# Patient Record
Sex: MALE | Race: BLACK OR AFRICAN AMERICAN | Employment: UNEMPLOYED | URBAN - METROPOLITAN AREA
[De-identification: names, ages, dates, MRNs, and addresses within clinical notes are randomized per-mention and may not be internally consistent; named-entity substitution may affect disease eponyms.]

---

## 2017-05-02 ENCOUNTER — ED HISTORICAL/CONVERTED ENCOUNTER (OUTPATIENT)
Dept: OTHER | Age: 62
End: 2017-05-02

## 2017-08-14 ENCOUNTER — ED HISTORICAL/CONVERTED ENCOUNTER (OUTPATIENT)
Dept: OTHER | Age: 62
End: 2017-08-14

## 2018-03-27 ENCOUNTER — IP HISTORICAL/CONVERTED ENCOUNTER (OUTPATIENT)
Dept: OTHER | Age: 63
End: 2018-03-27

## 2018-07-09 ENCOUNTER — IP HISTORICAL/CONVERTED ENCOUNTER (OUTPATIENT)
Dept: OTHER | Age: 63
End: 2018-07-09

## 2018-09-16 ENCOUNTER — OP HISTORICAL/CONVERTED ENCOUNTER (OUTPATIENT)
Dept: OTHER | Age: 63
End: 2018-09-16

## 2018-09-24 ENCOUNTER — OP HISTORICAL/CONVERTED ENCOUNTER (OUTPATIENT)
Dept: OTHER | Age: 63
End: 2018-09-24

## 2018-12-10 ENCOUNTER — IP HISTORICAL/CONVERTED ENCOUNTER (OUTPATIENT)
Dept: OTHER | Age: 63
End: 2018-12-10

## 2020-05-21 ENCOUNTER — ED HISTORICAL/CONVERTED ENCOUNTER (OUTPATIENT)
Dept: OTHER | Age: 65
End: 2020-05-21

## 2020-05-23 ENCOUNTER — IP HISTORICAL/CONVERTED ENCOUNTER (OUTPATIENT)
Dept: OTHER | Age: 65
End: 2020-05-23

## 2020-06-01 ENCOUNTER — ED HISTORICAL/CONVERTED ENCOUNTER (OUTPATIENT)
Dept: OTHER | Age: 65
End: 2020-06-01

## 2020-06-03 ENCOUNTER — IP HISTORICAL/CONVERTED ENCOUNTER (OUTPATIENT)
Dept: OTHER | Age: 65
End: 2020-06-03

## 2020-06-13 ENCOUNTER — ED HISTORICAL/CONVERTED ENCOUNTER (OUTPATIENT)
Dept: OTHER | Age: 65
End: 2020-06-13

## 2022-01-01 ENCOUNTER — APPOINTMENT (OUTPATIENT)
Dept: NON INVASIVE DIAGNOSTICS | Age: 67
DRG: 193 | End: 2022-01-01
Attending: INTERNAL MEDICINE
Payer: MEDICARE

## 2022-01-01 ENCOUNTER — HOSPITAL ENCOUNTER (INPATIENT)
Age: 67
LOS: 2 days | Discharge: HOME | DRG: 193 | End: 2022-03-25
Attending: EMERGENCY MEDICINE | Admitting: INTERNAL MEDICINE
Payer: MEDICARE

## 2022-01-01 ENCOUNTER — APPOINTMENT (OUTPATIENT)
Dept: NON INVASIVE DIAGNOSTICS | Age: 67
End: 2022-01-01
Attending: HOSPITALIST
Payer: MEDICARE

## 2022-01-01 ENCOUNTER — APPOINTMENT (OUTPATIENT)
Dept: CT IMAGING | Age: 67
DRG: 193 | End: 2022-01-01
Attending: EMERGENCY MEDICINE
Payer: MEDICARE

## 2022-01-01 ENCOUNTER — APPOINTMENT (OUTPATIENT)
Dept: GENERAL RADIOLOGY | Age: 67
End: 2022-01-01
Attending: EMERGENCY MEDICINE
Payer: OTHER GOVERNMENT

## 2022-01-01 ENCOUNTER — APPOINTMENT (OUTPATIENT)
Dept: GENERAL RADIOLOGY | Age: 67
End: 2022-01-01
Attending: EMERGENCY MEDICINE
Payer: MEDICARE

## 2022-01-01 ENCOUNTER — HOSPITAL ENCOUNTER (INPATIENT)
Age: 67
LOS: 1 days | DRG: 640 | End: 2022-10-02
Attending: STUDENT IN AN ORGANIZED HEALTH CARE EDUCATION/TRAINING PROGRAM | Admitting: STUDENT IN AN ORGANIZED HEALTH CARE EDUCATION/TRAINING PROGRAM
Payer: MEDICARE

## 2022-01-01 ENCOUNTER — APPOINTMENT (OUTPATIENT)
Dept: NON INVASIVE DIAGNOSTICS | Age: 67
End: 2022-01-01
Attending: EMERGENCY MEDICINE
Payer: MEDICARE

## 2022-01-01 ENCOUNTER — APPOINTMENT (OUTPATIENT)
Dept: NON INVASIVE DIAGNOSTICS | Age: 67
DRG: 640 | End: 2022-01-01
Attending: STUDENT IN AN ORGANIZED HEALTH CARE EDUCATION/TRAINING PROGRAM
Payer: MEDICARE

## 2022-01-01 ENCOUNTER — HOSPITAL ENCOUNTER (OUTPATIENT)
Age: 67
Setting detail: OBSERVATION
Discharge: HOME | End: 2022-08-15
Attending: EMERGENCY MEDICINE | Admitting: HOSPITALIST
Payer: MEDICARE

## 2022-01-01 ENCOUNTER — APPOINTMENT (OUTPATIENT)
Dept: GENERAL RADIOLOGY | Age: 67
DRG: 193 | End: 2022-01-01
Attending: EMERGENCY MEDICINE
Payer: MEDICARE

## 2022-01-01 ENCOUNTER — HOSPITAL ENCOUNTER (EMERGENCY)
Age: 67
Discharge: ANOTHER HOSPITAL | End: 2022-10-02
Attending: STUDENT IN AN ORGANIZED HEALTH CARE EDUCATION/TRAINING PROGRAM
Payer: OTHER GOVERNMENT

## 2022-01-01 ENCOUNTER — APPOINTMENT (OUTPATIENT)
Dept: CT IMAGING | Age: 67
End: 2022-01-01
Attending: STUDENT IN AN ORGANIZED HEALTH CARE EDUCATION/TRAINING PROGRAM
Payer: OTHER GOVERNMENT

## 2022-01-01 VITALS
DIASTOLIC BLOOD PRESSURE: 59 MMHG | RESPIRATION RATE: 17 BRPM | HEART RATE: 50 BPM | WEIGHT: 184.97 LBS | SYSTOLIC BLOOD PRESSURE: 84 MMHG | OXYGEN SATURATION: 64 % | BODY MASS INDEX: 25.09 KG/M2 | TEMPERATURE: 97.4 F

## 2022-01-01 VITALS
OXYGEN SATURATION: 96 % | SYSTOLIC BLOOD PRESSURE: 167 MMHG | HEIGHT: 73 IN | RESPIRATION RATE: 14 BRPM | TEMPERATURE: 98 F | BODY MASS INDEX: 21.62 KG/M2 | WEIGHT: 163.14 LBS | DIASTOLIC BLOOD PRESSURE: 88 MMHG | HEART RATE: 66 BPM

## 2022-01-01 VITALS
HEART RATE: 60 BPM | WEIGHT: 185 LBS | RESPIRATION RATE: 14 BRPM | SYSTOLIC BLOOD PRESSURE: 88 MMHG | OXYGEN SATURATION: 97 % | BODY MASS INDEX: 25.06 KG/M2 | HEIGHT: 72 IN | DIASTOLIC BLOOD PRESSURE: 42 MMHG | TEMPERATURE: 98.3 F

## 2022-01-01 VITALS
HEIGHT: 72 IN | OXYGEN SATURATION: 98 % | DIASTOLIC BLOOD PRESSURE: 74 MMHG | RESPIRATION RATE: 16 BRPM | BODY MASS INDEX: 22.25 KG/M2 | WEIGHT: 164.24 LBS | HEART RATE: 72 BPM | TEMPERATURE: 98.1 F | SYSTOLIC BLOOD PRESSURE: 146 MMHG

## 2022-01-01 DIAGNOSIS — J18.9 PNEUMONIA OF LEFT LOWER LOBE DUE TO INFECTIOUS ORGANISM: ICD-10-CM

## 2022-01-01 DIAGNOSIS — R77.8 ELEVATED TROPONIN: Primary | ICD-10-CM

## 2022-01-01 DIAGNOSIS — R06.02 SOB (SHORTNESS OF BREATH): ICD-10-CM

## 2022-01-01 DIAGNOSIS — E16.2 HYPOGLYCEMIA: Primary | ICD-10-CM

## 2022-01-01 DIAGNOSIS — R53.83 FATIGUE, UNSPECIFIED TYPE: Primary | ICD-10-CM

## 2022-01-01 DIAGNOSIS — R07.9 CHEST PAIN, UNSPECIFIED TYPE: ICD-10-CM

## 2022-01-01 DIAGNOSIS — I21.4 NSTEMI (NON-ST ELEVATED MYOCARDIAL INFARCTION) (HCC): ICD-10-CM

## 2022-01-01 DIAGNOSIS — R09.89 SUSPECTED DEEP VEIN THROMBOSIS (DVT): ICD-10-CM

## 2022-01-01 DIAGNOSIS — N18.6 ESRD (END STAGE RENAL DISEASE) (HCC): ICD-10-CM

## 2022-01-01 LAB
ALBUMIN SERPL-MCNC: 2.4 G/DL (ref 3.5–5)
ALBUMIN SERPL-MCNC: 2.5 G/DL (ref 3.5–5)
ALBUMIN SERPL-MCNC: 2.6 G/DL (ref 3.5–5)
ALBUMIN SERPL-MCNC: 2.7 G/DL (ref 3.5–5)
ALBUMIN SERPL-MCNC: 3.3 G/DL (ref 3.5–5)
ALBUMIN/GLOB SERPL: 0.6 {RATIO} (ref 1.1–2.2)
ALBUMIN/GLOB SERPL: 0.6 {RATIO} (ref 1.1–2.2)
ALBUMIN/GLOB SERPL: 0.8 {RATIO} (ref 1.1–2.2)
ALBUMIN/GLOB SERPL: 0.8 {RATIO} (ref 1.1–2.2)
ALP SERPL-CCNC: 41 U/L (ref 45–117)
ALP SERPL-CCNC: 46 U/L (ref 45–117)
ALP SERPL-CCNC: 52 U/L (ref 45–117)
ALP SERPL-CCNC: 77 U/L (ref 45–117)
ALT SERPL-CCNC: 15 U/L (ref 12–78)
ALT SERPL-CCNC: 22 U/L (ref 12–78)
ALT SERPL-CCNC: 24 U/L (ref 12–78)
ALT SERPL-CCNC: 34 U/L (ref 12–78)
ANION GAP SERPL CALC-SCNC: 10 MMOL/L (ref 5–15)
ANION GAP SERPL CALC-SCNC: 10 MMOL/L (ref 5–15)
ANION GAP SERPL CALC-SCNC: 3 MMOL/L (ref 5–15)
ANION GAP SERPL CALC-SCNC: 4 MMOL/L (ref 5–15)
ANION GAP SERPL CALC-SCNC: 5 MMOL/L (ref 5–15)
ANION GAP SERPL CALC-SCNC: 8 MMOL/L (ref 5–15)
APTT PPP: 73.1 SEC (ref 22.1–31)
ARTERIAL PATENCY WRIST A: ABNORMAL
ARTERIAL PATENCY WRIST A: POSITIVE
ARTERIAL PATENCY WRIST A: YES
AST SERPL W P-5'-P-CCNC: 25 U/L (ref 15–37)
AST SERPL W P-5'-P-CCNC: 50 U/L (ref 15–37)
AST SERPL W P-5'-P-CCNC: 54 U/L (ref 15–37)
AST SERPL-CCNC: 92 U/L (ref 15–37)
ATRIAL RATE: 58 BPM
ATRIAL RATE: 63 BPM
ATRIAL RATE: 80 BPM
BASE DEFICIT BLD-SCNC: 3.7 MMOL/L
BASE DEFICIT BLD-SCNC: 5.1 MMOL/L
BASE DEFICIT BLDA-SCNC: 3.9 MMOL/L
BASOPHILS # BLD: 0 K/UL (ref 0–0.1)
BASOPHILS NFR BLD: 0 % (ref 0–1)
BASOPHILS NFR BLD: 1 % (ref 0–1)
BDY SITE: ABNORMAL
BILIRUB SERPL-MCNC: 0.4 MG/DL (ref 0.2–1)
BILIRUB SERPL-MCNC: 0.6 MG/DL (ref 0.2–1)
BILIRUB SERPL-MCNC: 0.7 MG/DL (ref 0.2–1)
BILIRUB SERPL-MCNC: 0.8 MG/DL (ref 0.2–1)
BLASTS NFR BLD MANUAL: 1 %
BNP SERPL-MCNC: ABNORMAL PG/ML
BODY TEMPERATURE: 98.3
BUN SERPL-MCNC: 13 MG/DL (ref 6–20)
BUN SERPL-MCNC: 22 MG/DL (ref 6–20)
BUN SERPL-MCNC: 32 MG/DL (ref 6–20)
BUN SERPL-MCNC: 38 MG/DL (ref 6–20)
BUN SERPL-MCNC: 76 MG/DL (ref 6–20)
BUN SERPL-MCNC: 81 MG/DL (ref 6–20)
BUN/CREAT SERPL: 3 (ref 12–20)
BUN/CREAT SERPL: 6 (ref 12–20)
BUN/CREAT SERPL: 8 (ref 12–20)
BUN/CREAT SERPL: 8 (ref 12–20)
CA-I BLD-MCNC: 8 MG/DL (ref 8.5–10.1)
CA-I BLD-MCNC: 8.4 MG/DL (ref 8.5–10.1)
CA-I BLD-MCNC: 8.7 MG/DL (ref 8.5–10.1)
CA-I BLD-MCNC: 8.9 MG/DL (ref 8.5–10.1)
CA-I BLD-MCNC: 9 MG/DL (ref 8.5–10.1)
CA-I BLD-SCNC: 1.34 MMOL/L (ref 1.12–1.32)
CALCIUM SERPL-MCNC: 7.9 MG/DL (ref 8.5–10.1)
CALCULATED P AXIS, ECG09: 43 DEGREES
CALCULATED P AXIS, ECG09: 50 DEGREES
CALCULATED P AXIS, ECG09: 57 DEGREES
CALCULATED R AXIS, ECG10: -22 DEGREES
CALCULATED R AXIS, ECG10: -27 DEGREES
CALCULATED R AXIS, ECG10: -29 DEGREES
CALCULATED T AXIS, ECG11: 109 DEGREES
CALCULATED T AXIS, ECG11: 115 DEGREES
CALCULATED T AXIS, ECG11: 95 DEGREES
CHLORIDE SERPL-SCNC: 101 MMOL/L (ref 97–108)
CHLORIDE SERPL-SCNC: 103 MMOL/L (ref 97–108)
CHLORIDE SERPL-SCNC: 104 MMOL/L (ref 97–108)
CHLORIDE SERPL-SCNC: 94 MMOL/L (ref 97–108)
CHLORIDE SERPL-SCNC: 96 MMOL/L (ref 97–108)
CHLORIDE SERPL-SCNC: 98 MMOL/L (ref 97–108)
CO2 SERPL-SCNC: 24 MMOL/L (ref 21–32)
CO2 SERPL-SCNC: 25 MMOL/L (ref 21–32)
CO2 SERPL-SCNC: 25 MMOL/L (ref 21–32)
CO2 SERPL-SCNC: 30 MMOL/L (ref 21–32)
CO2 SERPL-SCNC: 30 MMOL/L (ref 21–32)
CO2 SERPL-SCNC: 32 MMOL/L (ref 21–32)
COHGB MFR BLD: 1.6 % (ref 1–2)
CORTIS AM PEAK SERPL-MCNC: 46.5 UG/DL (ref 4.3–22.45)
COVID-19 RAPID TEST, COVR: NOT DETECTED
COVID-19 RAPID TEST, COVR: NOT DETECTED
CREAT SERPL-MCNC: 4.74 MG/DL (ref 0.7–1.3)
CREAT SERPL-MCNC: 6.21 MG/DL (ref 0.7–1.3)
CREAT SERPL-MCNC: 6.86 MG/DL (ref 0.7–1.3)
CREAT SERPL-MCNC: 9.15 MG/DL (ref 0.7–1.3)
CREAT SERPL-MCNC: 9.76 MG/DL (ref 0.7–1.3)
CREAT SERPL-MCNC: 9.9 MG/DL (ref 0.7–1.3)
CRP SERPL-MCNC: 3.66 MG/DL (ref 0–0.6)
D DIMER PPP FEU-MCNC: 4.15 UG/ML(FEU)
DIAGNOSIS, 93000: NORMAL
DIFFERENTIAL METHOD BLD: ABNORMAL
ECHO AO ROOT DIAM: 3.5 CM
ECHO AO ROOT DIAM: 3.7 CM
ECHO AO ROOT INDEX: 1.78 CM/M2
ECHO AO ROOT INDEX: 1.89 CM/M2
ECHO AR MAX VEL PISA: 4.4 M/S
ECHO AR MAX VEL PISA: 4.7 M/S
ECHO AV AREA PEAK VELOCITY: 1.4 CM2
ECHO AV AREA VTI: 1.6 CM2
ECHO AV AREA/BSA PEAK VELOCITY: 0.7 CM2/M2
ECHO AV AREA/BSA VTI: 0.8 CM2/M2
ECHO AV MEAN GRADIENT: 9 MMHG
ECHO AV MEAN VELOCITY: 1.4 M/S
ECHO AV PEAK GRADIENT: 16 MMHG
ECHO AV PEAK GRADIENT: 16 MMHG
ECHO AV PEAK VELOCITY: 2 M/S
ECHO AV PEAK VELOCITY: 2 M/S
ECHO AV REGURGITANT PHT: 335 MS
ECHO AV REGURGITANT PHT: 516 MS
ECHO AV VELOCITY RATIO: 0.45
ECHO AV VELOCITY RATIO: 0.7
ECHO AV VTI: 42 CM
ECHO EST RA PRESSURE: 15 MMHG
ECHO EST RA PRESSURE: 8 MMHG
ECHO IVC EXP: 2.8 CM
ECHO IVC INSP: 2.4 CM
ECHO LA AREA 2C: 31.4 CM2
ECHO LA AREA 4C: 21.7 CM2
ECHO LA DIAMETER INDEX: 2.49 CM/M2
ECHO LA DIAMETER INDEX: 2.81 CM/M2
ECHO LA DIAMETER: 4.9 CM
ECHO LA DIAMETER: 5.5 CM
ECHO LA MAJOR AXIS: 5.5 CM
ECHO LA MINOR AXIS: 6 CM
ECHO LA TO AORTIC ROOT RATIO: 1.4
ECHO LA TO AORTIC ROOT RATIO: 1.49
ECHO LA VOL BP: 102 ML (ref 18–58)
ECHO LA VOL/BSA BIPLANE: 52 ML/M2 (ref 16–34)
ECHO LV E' LATERAL VELOCITY: 5 CM/S
ECHO LV E' LATERAL VELOCITY: 5 CM/S
ECHO LV E' SEPTAL VELOCITY: 5 CM/S
ECHO LV E' SEPTAL VELOCITY: 6 CM/S
ECHO LV EDV A2C: 137 ML
ECHO LV EDV A4C: 113 ML
ECHO LV EDV INDEX A4C: 58 ML/M2
ECHO LV EDV NDEX A2C: 70 ML/M2
ECHO LV EJECTION FRACTION A2C: 44 %
ECHO LV EJECTION FRACTION A4C: 30 %
ECHO LV EJECTION FRACTION BIPLANE: 35 % (ref 55–100)
ECHO LV EJECTION FRACTION BIPLANE: 56 % (ref 55–100)
ECHO LV ESV A2C: 77 ML
ECHO LV ESV A4C: 79 ML
ECHO LV ESV INDEX A2C: 39 ML/M2
ECHO LV ESV INDEX A4C: 40 ML/M2
ECHO LV FRACTIONAL SHORTENING: 19 % (ref 28–44)
ECHO LV FRACTIONAL SHORTENING: 29 % (ref 28–44)
ECHO LV INTERNAL DIMENSION DIASTOLE INDEX: 2.91 CM/M2
ECHO LV INTERNAL DIMENSION DIASTOLE INDEX: 2.94 CM/M2
ECHO LV INTERNAL DIMENSION DIASTOLIC: 5.7 CM (ref 4.2–5.9)
ECHO LV INTERNAL DIMENSION DIASTOLIC: 5.8 CM (ref 4.2–5.9)
ECHO LV INTERNAL DIMENSION SYSTOLIC INDEX: 2.08 CM/M2
ECHO LV INTERNAL DIMENSION SYSTOLIC INDEX: 2.35 CM/M2
ECHO LV INTERNAL DIMENSION SYSTOLIC: 4.1 CM
ECHO LV INTERNAL DIMENSION SYSTOLIC: 4.6 CM
ECHO LV IVSD: 1.7 CM (ref 0.6–1)
ECHO LV IVSD: 1.9 CM (ref 0.6–1)
ECHO LV MASS 2D: 465.2 G (ref 88–224)
ECHO LV MASS 2D: 515.7 G (ref 88–224)
ECHO LV MASS INDEX 2D: 236.2 G/M2 (ref 49–115)
ECHO LV MASS INDEX 2D: 263.1 G/M2 (ref 49–115)
ECHO LV POSTERIOR WALL DIASTOLIC: 1.4 CM (ref 0.6–1)
ECHO LV POSTERIOR WALL DIASTOLIC: 1.9 CM (ref 0.6–1)
ECHO LV RELATIVE WALL THICKNESS RATIO: 0.48
ECHO LV RELATIVE WALL THICKNESS RATIO: 0.67
ECHO LVOT AREA: 3.5 CM2
ECHO LVOT AV VTI INDEX: 0.47
ECHO LVOT DIAM: 2.1 CM
ECHO LVOT MEAN GRADIENT: 2 MMHG
ECHO LVOT PEAK GRADIENT: 3 MMHG
ECHO LVOT PEAK GRADIENT: 8 MMHG
ECHO LVOT PEAK VELOCITY: 0.9 M/S
ECHO LVOT PEAK VELOCITY: 1.4 M/S
ECHO LVOT STROKE VOLUME INDEX: 34.6 ML/M2
ECHO LVOT SV: 67.9 ML
ECHO LVOT VTI: 19.6 CM
ECHO MAIN PULMONARY ARTERY DIAMETER: 2.8 CM
ECHO MV A VELOCITY: 1.28 M/S
ECHO MV E DECELERATION TIME (DT): 229 MS
ECHO MV E VELOCITY: 1.18 M/S
ECHO MV E/A RATIO: 0.92
ECHO MV E/E' LATERAL: 23.6
ECHO MV E/E' RATIO (AVERAGED): 21.63
ECHO MV E/E' SEPTAL: 19.67
ECHO MV EROA PISA: 28.8 CM2
ECHO MV REGURGITANT ALIASING (NYQUIST) VELOCITY: 30 CM/S
ECHO MV REGURGITANT PEAK GRADIENT: 112 MMHG
ECHO MV REGURGITANT PEAK GRADIENT: 139 MMHG
ECHO MV REGURGITANT PEAK VELOCITY: 5.3 M/S
ECHO MV REGURGITANT PEAK VELOCITY: 5.9 M/S
ECHO MV REGURGITANT RADIUS PISA: 0.9 CM
ECHO MV REGURGITANT VOLUME PISA: 5470.71 ML
ECHO MV REGURGITANT VTIA: 190 CM
ECHO PULMONARY ARTERY END DIASTOLIC PRESSURE: 6 MMHG
ECHO PV MAX VELOCITY: 1.2 M/S
ECHO PV MAX VELOCITY: 1.3 M/S
ECHO PV PEAK GRADIENT: 5 MMHG
ECHO PV PEAK GRADIENT: 7 MMHG
ECHO PV REGURGITANT MAX VELOCITY: 1.3 M/S
ECHO PVEIN A DURATION: 169 MS
ECHO PVEIN A VELOCITY: 0.3 M/S
ECHO PVEIN PEAK D VELOCITY: 0.7 M/S
ECHO PVEIN PEAK S VELOCITY: 0.5 M/S
ECHO PVEIN S/D RATIO: 0.7 NO UNITS
ECHO RA AREA 4C: 25.9 CM2
ECHO RA END SYSTOLIC VOLUME APICAL 4 CHAMBER INDEX BSA: 49 ML/M2
ECHO RA VOLUME: 96 ML
ECHO RIGHT VENTRICULAR SYSTOLIC PRESSURE (RVSP): 47 MMHG
ECHO RIGHT VENTRICULAR SYSTOLIC PRESSURE (RVSP): 77 MMHG
ECHO RV INTERNAL DIMENSION: 3 CM
ECHO RV INTERNAL DIMENSION: 3.7 CM
ECHO RV TAPSE: 1.9 CM (ref 1.7–?)
ECHO TV REGURGITANT MAX VELOCITY: 3.11 M/S
ECHO TV REGURGITANT MAX VELOCITY: 3.94 M/S
ECHO TV REGURGITANT PEAK GRADIENT: 39 MMHG
ECHO TV REGURGITANT PEAK GRADIENT: 62 MMHG
EOSINOPHIL # BLD: 0 K/UL (ref 0–0.4)
EOSINOPHIL # BLD: 0.1 K/UL (ref 0–0.4)
EOSINOPHIL NFR BLD: 0 % (ref 0–7)
EOSINOPHIL NFR BLD: 1 % (ref 0–7)
EOSINOPHIL NFR BLD: 1 % (ref 0–7)
EOSINOPHIL NFR BLD: 2 % (ref 0–7)
EOSINOPHIL NFR BLD: 3 % (ref 0–7)
ERYTHROCYTE [DISTWIDTH] IN BLOOD BY AUTOMATED COUNT: 13.5 % (ref 11.5–14.5)
ERYTHROCYTE [DISTWIDTH] IN BLOOD BY AUTOMATED COUNT: 13.7 % (ref 11.5–14.5)
ERYTHROCYTE [DISTWIDTH] IN BLOOD BY AUTOMATED COUNT: 14.9 % (ref 11.5–14.5)
ERYTHROCYTE [DISTWIDTH] IN BLOOD BY AUTOMATED COUNT: 15.2 % (ref 11.5–14.5)
ERYTHROCYTE [DISTWIDTH] IN BLOOD BY AUTOMATED COUNT: 15.3 % (ref 11.5–14.5)
FIO2 ON VENT: 44 %
FLUAV AG NPH QL IA: NEGATIVE
FLUBV AG NOSE QL IA: NEGATIVE
GAS FLOW.O2 O2 DELIVERY SYS: 6 L/MIN
GAS FLOW.O2 O2 DELIVERY SYS: ABNORMAL L/MIN
GAS FLOW.O2 O2 DELIVERY SYS: ABNORMAL L/MIN
GAS FLOW.O2 SETTING OXYMISER: 20 BPM
GLOBULIN SER CALC-MCNC: 3.5 G/DL (ref 2–4)
GLOBULIN SER CALC-MCNC: 3.7 G/DL (ref 2–4)
GLOBULIN SER CALC-MCNC: 4 G/DL (ref 2–4)
GLOBULIN SER CALC-MCNC: 4.2 G/DL (ref 2–4)
GLUCOSE BLD STRIP.AUTO-MCNC: 105 MG/DL (ref 65–100)
GLUCOSE BLD STRIP.AUTO-MCNC: 119 MG/DL (ref 65–100)
GLUCOSE BLD STRIP.AUTO-MCNC: 124 MG/DL (ref 65–100)
GLUCOSE BLD STRIP.AUTO-MCNC: 144 MG/DL (ref 65–100)
GLUCOSE BLD STRIP.AUTO-MCNC: 25 MG/DL (ref 65–100)
GLUCOSE BLD STRIP.AUTO-MCNC: 30 MG/DL (ref 65–100)
GLUCOSE BLD STRIP.AUTO-MCNC: 30 MG/DL (ref 65–117)
GLUCOSE BLD STRIP.AUTO-MCNC: 33 MG/DL (ref 65–117)
GLUCOSE BLD STRIP.AUTO-MCNC: 34 MG/DL (ref 65–100)
GLUCOSE BLD STRIP.AUTO-MCNC: 35 MG/DL (ref 65–117)
GLUCOSE BLD STRIP.AUTO-MCNC: 39 MG/DL (ref 65–100)
GLUCOSE BLD STRIP.AUTO-MCNC: 390 MG/DL (ref 65–117)
GLUCOSE BLD STRIP.AUTO-MCNC: 41 MG/DL (ref 65–117)
GLUCOSE BLD STRIP.AUTO-MCNC: 420 MG/DL (ref 65–117)
GLUCOSE BLD STRIP.AUTO-MCNC: 43 MG/DL (ref 65–100)
GLUCOSE BLD STRIP.AUTO-MCNC: 454 MG/DL (ref 65–100)
GLUCOSE BLD STRIP.AUTO-MCNC: 55 MG/DL (ref 65–100)
GLUCOSE BLD STRIP.AUTO-MCNC: 72 MG/DL (ref 65–100)
GLUCOSE SERPL-MCNC: 138 MG/DL (ref 65–100)
GLUCOSE SERPL-MCNC: 28 MG/DL (ref 65–100)
GLUCOSE SERPL-MCNC: 35 MG/DL (ref 65–100)
GLUCOSE SERPL-MCNC: 80 MG/DL (ref 65–100)
GLUCOSE SERPL-MCNC: 87 MG/DL (ref 65–100)
GLUCOSE SERPL-MCNC: 96 MG/DL (ref 65–100)
HCO3 BLD-SCNC: 21.7 MMOL/L (ref 22–26)
HCO3 BLD-SCNC: 25 MMOL/L (ref 22–26)
HCO3 BLDA-SCNC: 21 MMOL/L (ref 22–26)
HCT VFR BLD AUTO: 33.7 % (ref 36.6–50.3)
HCT VFR BLD AUTO: 34.4 % (ref 36.6–50.3)
HCT VFR BLD AUTO: 35.5 % (ref 36.6–50.3)
HCT VFR BLD AUTO: 36.3 % (ref 36.6–50.3)
HCT VFR BLD AUTO: 36.7 % (ref 36.6–50.3)
HGB BLD-MCNC: 10.9 G/DL (ref 12.1–17)
HGB BLD-MCNC: 11 G/DL (ref 12.1–17)
HGB BLD-MCNC: 11.4 G/DL (ref 12.1–17)
HGB BLD-MCNC: 11.7 G/DL (ref 12.1–17)
HGB BLD-MCNC: 11.8 G/DL (ref 12.1–17)
IMM GRANULOCYTES # BLD AUTO: 0 K/UL
IMM GRANULOCYTES # BLD AUTO: 0 K/UL
IMM GRANULOCYTES # BLD AUTO: 0 K/UL (ref 0–0.04)
IMM GRANULOCYTES NFR BLD AUTO: 0 %
IMM GRANULOCYTES NFR BLD AUTO: 0 %
IMM GRANULOCYTES NFR BLD AUTO: 0 % (ref 0–0.5)
INR PPP: 1.9 (ref 0.9–1.1)
LACTATE SERPL-SCNC: 1.5 MMOL/L (ref 0.4–2)
LACTATE SERPL-SCNC: 3 MMOL/L (ref 0.4–2)
LYMPHOCYTES # BLD: 0.4 K/UL (ref 0.8–3.5)
LYMPHOCYTES # BLD: 0.4 K/UL (ref 0.8–3.5)
LYMPHOCYTES # BLD: 0.5 K/UL (ref 0.8–3.5)
LYMPHOCYTES # BLD: 0.5 K/UL (ref 0.8–3.5)
LYMPHOCYTES # BLD: 0.7 K/UL (ref 0.8–3.5)
LYMPHOCYTES NFR BLD: 12 % (ref 12–49)
LYMPHOCYTES NFR BLD: 14 % (ref 12–49)
LYMPHOCYTES NFR BLD: 4 % (ref 12–49)
LYMPHOCYTES NFR BLD: 4 % (ref 12–49)
LYMPHOCYTES NFR BLD: 7 % (ref 12–49)
M PNEUMO IGM SER IA-ACNC: REACTIVE
MAGNESIUM SERPL-MCNC: 2.7 MG/DL (ref 1.6–2.4)
MAGNESIUM SERPL-MCNC: 2.7 MG/DL (ref 1.6–2.4)
MCH RBC QN AUTO: 31.2 PG (ref 26–34)
MCH RBC QN AUTO: 31.8 PG (ref 26–34)
MCH RBC QN AUTO: 32.3 PG (ref 26–34)
MCH RBC QN AUTO: 32.4 PG (ref 26–34)
MCH RBC QN AUTO: 32.9 PG (ref 26–34)
MCHC RBC AUTO-ENTMCNC: 31.9 G/DL (ref 30–36.5)
MCHC RBC AUTO-ENTMCNC: 32 G/DL (ref 30–36.5)
MCHC RBC AUTO-ENTMCNC: 32.1 G/DL (ref 30–36.5)
MCHC RBC AUTO-ENTMCNC: 32.3 G/DL (ref 30–36.5)
MCHC RBC AUTO-ENTMCNC: 32.5 G/DL (ref 30–36.5)
MCV RBC AUTO: 100.9 FL (ref 80–99)
MCV RBC AUTO: 101.7 FL (ref 80–99)
MCV RBC AUTO: 102.3 FL (ref 80–99)
MCV RBC AUTO: 96.6 FL (ref 80–99)
MCV RBC AUTO: 97.8 FL (ref 80–99)
METAMYELOCYTES NFR BLD MANUAL: 10 %
METAMYELOCYTES NFR BLD MANUAL: 12 %
METHGB MFR BLD: 0.3 % (ref 0–1.4)
MONOCYTES # BLD: 0.5 K/UL (ref 0–1)
MONOCYTES # BLD: 0.6 K/UL (ref 0–1)
MONOCYTES # BLD: 0.6 K/UL (ref 0–1)
MONOCYTES # BLD: 0.8 K/UL (ref 0–1)
MONOCYTES # BLD: 1.6 K/UL (ref 0–1)
MONOCYTES NFR BLD: 11 % (ref 5–13)
MONOCYTES NFR BLD: 11 % (ref 5–13)
MONOCYTES NFR BLD: 12 % (ref 5–13)
MONOCYTES NFR BLD: 16 % (ref 5–13)
MONOCYTES NFR BLD: 6 % (ref 5–13)
MYELOCYTES NFR BLD MANUAL: 1 %
NEUTS BAND NFR BLD MANUAL: 11 % (ref 0–6)
NEUTS SEG # BLD: 2.8 K/UL (ref 1.8–8)
NEUTS SEG # BLD: 4.4 K/UL (ref 1.8–8)
NEUTS SEG # BLD: 5.6 K/UL (ref 1.8–8)
NEUTS SEG # BLD: 7.1 K/UL (ref 1.8–8)
NEUTS SEG # BLD: 8.1 K/UL (ref 1.8–8)
NEUTS SEG NFR BLD: 64 % (ref 32–75)
NEUTS SEG NFR BLD: 70 % (ref 32–75)
NEUTS SEG NFR BLD: 70 % (ref 32–75)
NEUTS SEG NFR BLD: 75 % (ref 32–75)
NEUTS SEG NFR BLD: 81 % (ref 32–75)
NRBC # BLD: 0 K/UL (ref 0–0.01)
NRBC BLD-RTO: 0 PER 100 WBC
O2/TOTAL GAS SETTING VFR VENT: 100 %
O2/TOTAL GAS SETTING VFR VENT: 40 %
OXYHGB MFR BLD: 96.6 % (ref 95–99)
P-R INTERVAL, ECG05: 166 MS
P-R INTERVAL, ECG05: 174 MS
P-R INTERVAL, ECG05: 188 MS
PCO2 BLD: 39.9 MMHG (ref 35–45)
PCO2 BLD: 79.6 MMHG (ref 35–45)
PCO2 BLDA: 36 MMHG (ref 35–45)
PEEP RESPIRATORY: 5 CMH2O
PERFORMED BY, TECHID: ABNORMAL
PERFORMED BY, TECHID: NORMAL
PH BLD: 7.11 [PH] (ref 7.35–7.45)
PH BLD: 7.34 [PH] (ref 7.35–7.45)
PH BLDA: 7.38 [PH] (ref 7.35–7.45)
PHOSPHATE SERPL-MCNC: 4.1 MG/DL (ref 2.6–4.7)
PHOSPHATE SERPL-MCNC: 7 MG/DL (ref 2.6–4.7)
PLATELET # BLD AUTO: 117 K/UL (ref 150–400)
PLATELET # BLD AUTO: 121 K/UL (ref 150–400)
PLATELET # BLD AUTO: 132 K/UL (ref 150–400)
PLATELET # BLD AUTO: 54 K/UL (ref 150–400)
PLATELET # BLD AUTO: 78 K/UL (ref 150–400)
PMV BLD AUTO: 10.4 FL (ref 8.9–12.9)
PMV BLD AUTO: 10.8 FL (ref 8.9–12.9)
PMV BLD AUTO: 11.8 FL (ref 8.9–12.9)
PMV BLD AUTO: 9.5 FL (ref 8.9–12.9)
PO2 BLD: 17 MMHG (ref 80–100)
PO2 BLD: 77 MMHG (ref 80–100)
PO2 BLDA: 135 MMHG (ref 80–100)
POTASSIUM SERPL-SCNC: 3.7 MMOL/L (ref 3.5–5.1)
POTASSIUM SERPL-SCNC: 3.8 MMOL/L (ref 3.5–5.1)
POTASSIUM SERPL-SCNC: 3.9 MMOL/L (ref 3.5–5.1)
POTASSIUM SERPL-SCNC: 4.6 MMOL/L (ref 3.5–5.1)
POTASSIUM SERPL-SCNC: 5.6 MMOL/L (ref 3.5–5.1)
POTASSIUM SERPL-SCNC: 5.8 MMOL/L (ref 3.5–5.1)
POTASSIUM SERPL-SCNC: 5.9 MMOL/L (ref 3.5–5.1)
PROT SERPL-MCNC: 6.1 G/DL (ref 6.4–8.2)
PROT SERPL-MCNC: 6.2 G/DL (ref 6.4–8.2)
PROT SERPL-MCNC: 6.5 G/DL (ref 6.4–8.2)
PROT SERPL-MCNC: 7.5 G/DL (ref 6.4–8.2)
PROTHROMBIN TIME: 19.5 SEC (ref 9–11.1)
Q-T INTERVAL, ECG07: 438 MS
Q-T INTERVAL, ECG07: 478 MS
Q-T INTERVAL, ECG07: 492 MS
QRS DURATION, ECG06: 90 MS
QRS DURATION, ECG06: 92 MS
QRS DURATION, ECG06: 98 MS
QTC CALCULATION (BEZET), ECG08: 482 MS
QTC CALCULATION (BEZET), ECG08: 489 MS
QTC CALCULATION (BEZET), ECG08: 505 MS
RBC # BLD AUTO: 3.41 M/UL (ref 4.1–5.7)
RBC # BLD AUTO: 3.47 M/UL (ref 4.1–5.7)
RBC # BLD AUTO: 3.49 M/UL (ref 4.1–5.7)
RBC # BLD AUTO: 3.61 M/UL (ref 4.1–5.7)
RBC # BLD AUTO: 3.71 M/UL (ref 4.1–5.7)
RBC MORPH BLD: ABNORMAL
SAO2 % BLD: 13.7 % (ref 92–97)
SAO2 % BLD: 94.6 % (ref 92–97)
SAO2 % BLD: 99 % (ref 95–99)
SAO2% DEVICE SAO2% SENSOR NAME: ABNORMAL
SARS-COV-2, COV2: NORMAL
SERVICE CMNT-IMP: ABNORMAL
SODIUM SERPL-SCNC: 128 MMOL/L (ref 136–145)
SODIUM SERPL-SCNC: 131 MMOL/L (ref 136–145)
SODIUM SERPL-SCNC: 133 MMOL/L (ref 136–145)
SODIUM SERPL-SCNC: 136 MMOL/L (ref 136–145)
SODIUM SERPL-SCNC: 137 MMOL/L (ref 136–145)
SODIUM SERPL-SCNC: 137 MMOL/L (ref 136–145)
SPECIMEN SITE: ABNORMAL
SPECIMEN TYPE: ABNORMAL
SPECIMEN TYPE: ABNORMAL
THERAPEUTIC RANGE,PTTT: ABNORMAL SECS (ref 58–77)
TROPONIN-HIGH SENSITIVITY: 100 NG/L (ref 0–76)
TROPONIN-HIGH SENSITIVITY: 1096 NG/L (ref 0–76)
TROPONIN-HIGH SENSITIVITY: 1286 NG/L (ref 0–76)
TROPONIN-HIGH SENSITIVITY: 58 NG/L (ref 0–76)
TROPONIN-HIGH SENSITIVITY: 61 NG/L (ref 0–76)
TROPONIN-HIGH SENSITIVITY: 68 NG/L (ref 0–76)
TROPONIN-HIGH SENSITIVITY: 81 NG/L (ref 0–76)
TROPONIN-HIGH SENSITIVITY: 93 NG/L (ref 0–76)
TSH SERPL DL<=0.05 MIU/L-ACNC: 13.2 UIU/ML (ref 0.36–3.74)
VENTILATION MODE VENT: ABNORMAL
VENTRICULAR RATE, ECG03: 58 BPM
VENTRICULAR RATE, ECG03: 63 BPM
VENTRICULAR RATE, ECG03: 80 BPM
VT SETTING VENT: 400 ML
WBC # BLD AUTO: 10.1 K/UL (ref 4.1–11.1)
WBC # BLD AUTO: 10.8 K/UL (ref 4.1–11.1)
WBC # BLD AUTO: 3.9 K/UL (ref 4.1–11.1)
WBC # BLD AUTO: 5.9 K/UL (ref 4.1–11.1)
WBC # BLD AUTO: 7 K/UL (ref 4.1–11.1)

## 2022-01-01 PROCEDURE — 74011250637 HC RX REV CODE- 250/637: Performed by: PHYSICIAN ASSISTANT

## 2022-01-01 PROCEDURE — 74011000250 HC RX REV CODE- 250: Performed by: STUDENT IN AN ORGANIZED HEALTH CARE EDUCATION/TRAINING PROGRAM

## 2022-01-01 PROCEDURE — 93005 ELECTROCARDIOGRAM TRACING: CPT

## 2022-01-01 PROCEDURE — 96372 THER/PROPH/DIAG INJ SC/IM: CPT

## 2022-01-01 PROCEDURE — G0378 HOSPITAL OBSERVATION PER HR: HCPCS

## 2022-01-01 PROCEDURE — 36415 COLL VENOUS BLD VENIPUNCTURE: CPT

## 2022-01-01 PROCEDURE — 99285 EMERGENCY DEPT VISIT HI MDM: CPT

## 2022-01-01 PROCEDURE — 71250 CT THORAX DX C-: CPT

## 2022-01-01 PROCEDURE — 0BH17EZ INSERTION OF ENDOTRACHEAL AIRWAY INTO TRACHEA, VIA NATURAL OR ARTIFICIAL OPENING: ICD-10-PCS | Performed by: STUDENT IN AN ORGANIZED HEALTH CARE EDUCATION/TRAINING PROGRAM

## 2022-01-01 PROCEDURE — 85025 COMPLETE CBC W/AUTO DIFF WBC: CPT

## 2022-01-01 PROCEDURE — 80053 COMPREHEN METABOLIC PANEL: CPT

## 2022-01-01 PROCEDURE — 74011250636 HC RX REV CODE- 250/636: Performed by: HOSPITALIST

## 2022-01-01 PROCEDURE — 96365 THER/PROPH/DIAG IV INF INIT: CPT

## 2022-01-01 PROCEDURE — 71045 X-RAY EXAM CHEST 1 VIEW: CPT

## 2022-01-01 PROCEDURE — 96374 THER/PROPH/DIAG INJ IV PUSH: CPT

## 2022-01-01 PROCEDURE — 80069 RENAL FUNCTION PANEL: CPT

## 2022-01-01 PROCEDURE — 65270000029 HC RM PRIVATE

## 2022-01-01 PROCEDURE — 93306 TTE W/DOPPLER COMPLETE: CPT

## 2022-01-01 PROCEDURE — 74011250637 HC RX REV CODE- 250/637: Performed by: EMERGENCY MEDICINE

## 2022-01-01 PROCEDURE — 74011000258 HC RX REV CODE- 258: Performed by: PHYSICIAN ASSISTANT

## 2022-01-01 PROCEDURE — 36600 WITHDRAWAL OF ARTERIAL BLOOD: CPT

## 2022-01-01 PROCEDURE — 96366 THER/PROPH/DIAG IV INF ADDON: CPT

## 2022-01-01 PROCEDURE — 74011250637 HC RX REV CODE- 250/637: Performed by: HOSPITALIST

## 2022-01-01 PROCEDURE — 94002 VENT MGMT INPAT INIT DAY: CPT

## 2022-01-01 PROCEDURE — 84484 ASSAY OF TROPONIN QUANT: CPT

## 2022-01-01 PROCEDURE — 65610000003 HC RM ICU SURGICAL

## 2022-01-01 PROCEDURE — 86140 C-REACTIVE PROTEIN: CPT

## 2022-01-01 PROCEDURE — 80048 BASIC METABOLIC PNL TOTAL CA: CPT

## 2022-01-01 PROCEDURE — 74011250636 HC RX REV CODE- 250/636: Performed by: INTERNAL MEDICINE

## 2022-01-01 PROCEDURE — 83735 ASSAY OF MAGNESIUM: CPT

## 2022-01-01 PROCEDURE — 93971 EXTREMITY STUDY: CPT | Performed by: SURGERY

## 2022-01-01 PROCEDURE — 90935 HEMODIALYSIS ONE EVALUATION: CPT

## 2022-01-01 PROCEDURE — 74011000636 HC RX REV CODE- 636: Performed by: STUDENT IN AN ORGANIZED HEALTH CARE EDUCATION/TRAINING PROGRAM

## 2022-01-01 PROCEDURE — 82803 BLOOD GASES ANY COMBINATION: CPT

## 2022-01-01 PROCEDURE — 04HY32Z INSERTION OF MONITORING DEVICE INTO LOWER ARTERY, PERCUTANEOUS APPROACH: ICD-10-PCS | Performed by: STUDENT IN AN ORGANIZED HEALTH CARE EDUCATION/TRAINING PROGRAM

## 2022-01-01 PROCEDURE — 94660 CPAP INITIATION&MGMT: CPT

## 2022-01-01 PROCEDURE — 83605 ASSAY OF LACTIC ACID: CPT

## 2022-01-01 PROCEDURE — 31500 INSERT EMERGENCY AIRWAY: CPT

## 2022-01-01 PROCEDURE — 86738 MYCOPLASMA ANTIBODY: CPT

## 2022-01-01 PROCEDURE — 75810000455 HC PLCMT CENT VENOUS CATH LVL 2 5182

## 2022-01-01 PROCEDURE — 83880 ASSAY OF NATRIURETIC PEPTIDE: CPT

## 2022-01-01 PROCEDURE — 82962 GLUCOSE BLOOD TEST: CPT

## 2022-01-01 PROCEDURE — 74011000258 HC RX REV CODE- 258: Performed by: INTERNAL MEDICINE

## 2022-01-01 PROCEDURE — 87040 BLOOD CULTURE FOR BACTERIA: CPT

## 2022-01-01 PROCEDURE — 5A1935Z RESPIRATORY VENTILATION, LESS THAN 24 CONSECUTIVE HOURS: ICD-10-PCS | Performed by: STUDENT IN AN ORGANIZED HEALTH CARE EDUCATION/TRAINING PROGRAM

## 2022-01-01 PROCEDURE — 74011000250 HC RX REV CODE- 250: Performed by: HOSPITALIST

## 2022-01-01 PROCEDURE — 74011250636 HC RX REV CODE- 250/636: Performed by: EMERGENCY MEDICINE

## 2022-01-01 PROCEDURE — 85379 FIBRIN DEGRADATION QUANT: CPT

## 2022-01-01 PROCEDURE — 84100 ASSAY OF PHOSPHORUS: CPT

## 2022-01-01 PROCEDURE — 82533 TOTAL CORTISOL: CPT

## 2022-01-01 PROCEDURE — 71275 CT ANGIOGRAPHY CHEST: CPT

## 2022-01-01 PROCEDURE — 84443 ASSAY THYROID STIM HORMONE: CPT

## 2022-01-01 PROCEDURE — 96376 TX/PRO/DX INJ SAME DRUG ADON: CPT

## 2022-01-01 PROCEDURE — 74011250636 HC RX REV CODE- 250/636: Performed by: PHYSICIAN ASSISTANT

## 2022-01-01 PROCEDURE — 96375 TX/PRO/DX INJ NEW DRUG ADDON: CPT

## 2022-01-01 PROCEDURE — 74011000258 HC RX REV CODE- 258: Performed by: EMERGENCY MEDICINE

## 2022-01-01 PROCEDURE — 5A1D70Z PERFORMANCE OF URINARY FILTRATION, INTERMITTENT, LESS THAN 6 HOURS PER DAY: ICD-10-PCS | Performed by: INTERNAL MEDICINE

## 2022-01-01 PROCEDURE — 87635 SARS-COV-2 COVID-19 AMP PRB: CPT

## 2022-01-01 PROCEDURE — 87804 INFLUENZA ASSAY W/OPTIC: CPT

## 2022-01-01 PROCEDURE — 74011000250 HC RX REV CODE- 250: Performed by: PHYSICIAN ASSISTANT

## 2022-01-01 PROCEDURE — 74011250636 HC RX REV CODE- 250/636: Performed by: STUDENT IN AN ORGANIZED HEALTH CARE EDUCATION/TRAINING PROGRAM

## 2022-01-01 PROCEDURE — 02HV33Z INSERTION OF INFUSION DEVICE INTO SUPERIOR VENA CAVA, PERCUTANEOUS APPROACH: ICD-10-PCS | Performed by: STUDENT IN AN ORGANIZED HEALTH CARE EDUCATION/TRAINING PROGRAM

## 2022-01-01 PROCEDURE — B548ZZA ULTRASONOGRAPHY OF SUPERIOR VENA CAVA, GUIDANCE: ICD-10-PCS | Performed by: STUDENT IN AN ORGANIZED HEALTH CARE EDUCATION/TRAINING PROGRAM

## 2022-01-01 PROCEDURE — 85610 PROTHROMBIN TIME: CPT

## 2022-01-01 PROCEDURE — 84132 ASSAY OF SERUM POTASSIUM: CPT

## 2022-01-01 PROCEDURE — 93971 EXTREMITY STUDY: CPT

## 2022-01-01 PROCEDURE — 85730 THROMBOPLASTIN TIME PARTIAL: CPT

## 2022-01-01 PROCEDURE — 94760 N-INVAS EAR/PLS OXIMETRY 1: CPT

## 2022-01-01 RX ORDER — GLYCERIN/MALTODEXTRIN
60 LIQUID (ML) ORAL
COMMUNITY

## 2022-01-01 RX ORDER — HYDRALAZINE HYDROCHLORIDE 25 MG/1
25 TABLET, FILM COATED ORAL 3 TIMES DAILY
COMMUNITY
End: 2022-01-01

## 2022-01-01 RX ORDER — HEPARIN SODIUM 1000 [USP'U]/ML
2750 INJECTION, SOLUTION INTRAVENOUS; SUBCUTANEOUS
COMMUNITY

## 2022-01-01 RX ORDER — HEPARIN SODIUM 1000 [USP'U]/ML
4000 INJECTION, SOLUTION INTRAVENOUS; SUBCUTANEOUS AS NEEDED
Status: DISCONTINUED | OUTPATIENT
Start: 2022-01-01 | End: 2022-01-01 | Stop reason: HOSPADM

## 2022-01-01 RX ORDER — ATORVASTATIN CALCIUM 40 MG/1
40 TABLET, FILM COATED ORAL DAILY
Status: DISCONTINUED | OUTPATIENT
Start: 2022-01-01 | End: 2022-01-01 | Stop reason: HOSPADM

## 2022-01-01 RX ORDER — SODIUM CHLORIDE 0.9 % (FLUSH) 0.9 %
5-40 SYRINGE (ML) INJECTION AS NEEDED
Status: DISCONTINUED | OUTPATIENT
Start: 2022-01-01 | End: 2022-01-01 | Stop reason: HOSPADM

## 2022-01-01 RX ORDER — HEPARIN SODIUM 5000 [USP'U]/ML
5000 INJECTION, SOLUTION INTRAVENOUS; SUBCUTANEOUS EVERY 8 HOURS
Status: DISCONTINUED | OUTPATIENT
Start: 2022-01-01 | End: 2022-01-01 | Stop reason: HOSPADM

## 2022-01-01 RX ORDER — NITROGLYCERIN 0.4 MG/1
0.4 TABLET SUBLINGUAL AS NEEDED
Status: DISCONTINUED | OUTPATIENT
Start: 2022-01-01 | End: 2022-01-01 | Stop reason: HOSPADM

## 2022-01-01 RX ORDER — CALCIUM CHLORIDE INJECTION 100 MG/ML
INJECTION, SOLUTION INTRAVENOUS
Status: COMPLETED | OUTPATIENT
Start: 2022-01-01 | End: 2022-01-01

## 2022-01-01 RX ORDER — LOSARTAN POTASSIUM 50 MG/1
50 TABLET ORAL DAILY
Status: DISCONTINUED | OUTPATIENT
Start: 2022-01-01 | End: 2022-01-01 | Stop reason: HOSPADM

## 2022-01-01 RX ORDER — ACETAMINOPHEN 325 MG/1
650 TABLET ORAL
Status: DISCONTINUED | OUTPATIENT
Start: 2022-01-01 | End: 2022-01-01 | Stop reason: HOSPADM

## 2022-01-01 RX ORDER — TAMSULOSIN HYDROCHLORIDE 0.4 MG/1
0.4 CAPSULE ORAL DAILY
COMMUNITY

## 2022-01-01 RX ORDER — GUAIFENESIN 100 MG/5ML
324 LIQUID (ML) ORAL
Status: COMPLETED | OUTPATIENT
Start: 2022-01-01 | End: 2022-01-01

## 2022-01-01 RX ORDER — FENTANYL CITRATE 50 UG/ML
150 INJECTION, SOLUTION INTRAMUSCULAR; INTRAVENOUS ONCE
Status: COMPLETED | OUTPATIENT
Start: 2022-01-01 | End: 2022-01-01

## 2022-01-01 RX ORDER — DOXYCYCLINE 100 MG/1
100 TABLET ORAL 2 TIMES DAILY
Qty: 14 TABLET | Refills: 0 | Status: SHIPPED | OUTPATIENT
Start: 2022-01-01 | End: 2022-01-01

## 2022-01-01 RX ORDER — CINACALCET 30 MG/1
120 TABLET, FILM COATED ORAL
Status: DISCONTINUED | OUTPATIENT
Start: 2022-01-01 | End: 2022-01-01 | Stop reason: HOSPADM

## 2022-01-01 RX ORDER — ONDANSETRON 4 MG/1
4 TABLET, ORALLY DISINTEGRATING ORAL
Status: DISCONTINUED | OUTPATIENT
Start: 2022-01-01 | End: 2022-01-01 | Stop reason: HOSPADM

## 2022-01-01 RX ORDER — ASPIRIN 81 MG/1
81 TABLET ORAL DAILY
COMMUNITY

## 2022-01-01 RX ORDER — CLONIDINE HYDROCHLORIDE 0.1 MG/1
0.1 TABLET ORAL
COMMUNITY

## 2022-01-01 RX ORDER — POLYETHYLENE GLYCOL 3350 17 G/17G
17 POWDER, FOR SOLUTION ORAL DAILY PRN
Status: DISCONTINUED | OUTPATIENT
Start: 2022-01-01 | End: 2022-01-01 | Stop reason: HOSPADM

## 2022-01-01 RX ORDER — ONDANSETRON 2 MG/ML
4 INJECTION INTRAMUSCULAR; INTRAVENOUS
Status: DISCONTINUED | OUTPATIENT
Start: 2022-01-01 | End: 2022-01-01 | Stop reason: HOSPADM

## 2022-01-01 RX ORDER — IRON SUCROSE 20 MG/ML
200 INJECTION, SOLUTION INTRAVENOUS
COMMUNITY

## 2022-01-01 RX ORDER — CHOLECALCIFEROL (VITAMIN D3) 125 MCG
1 CAPSULE ORAL DAILY
COMMUNITY

## 2022-01-01 RX ORDER — CARVEDILOL 12.5 MG/1
25 TABLET ORAL 2 TIMES DAILY
Status: DISCONTINUED | OUTPATIENT
Start: 2022-01-01 | End: 2022-01-01 | Stop reason: HOSPADM

## 2022-01-01 RX ORDER — ASPIRIN 81 MG/1
81 TABLET ORAL DAILY
Status: DISCONTINUED | OUTPATIENT
Start: 2022-01-01 | End: 2022-01-01 | Stop reason: HOSPADM

## 2022-01-01 RX ORDER — FAMOTIDINE 10 MG/1
10 TABLET ORAL 2 TIMES DAILY
Status: DISCONTINUED | OUTPATIENT
Start: 2022-01-01 | End: 2022-01-01 | Stop reason: HOSPADM

## 2022-01-01 RX ORDER — EPINEPHRINE 0.1 MG/ML
INJECTION INTRACARDIAC; INTRAVENOUS
Status: COMPLETED | OUTPATIENT
Start: 2022-01-01 | End: 2022-01-01

## 2022-01-01 RX ORDER — DEXTROSE 50 % IN WATER (D50W) INTRAVENOUS SYRINGE
Status: COMPLETED | OUTPATIENT
Start: 2022-01-01 | End: 2022-01-01

## 2022-01-01 RX ORDER — CARVEDILOL 25 MG/1
25 TABLET ORAL 2 TIMES DAILY
COMMUNITY

## 2022-01-01 RX ORDER — TAMSULOSIN HYDROCHLORIDE 0.4 MG/1
0.4 CAPSULE ORAL DAILY
Status: DISCONTINUED | OUTPATIENT
Start: 2022-01-01 | End: 2022-01-01 | Stop reason: HOSPADM

## 2022-01-01 RX ORDER — ISOSORBIDE MONONITRATE 60 MG/1
30 TABLET, EXTENDED RELEASE ORAL
COMMUNITY

## 2022-01-01 RX ORDER — DEXTROSE MONOHYDRATE 100 MG/ML
0-250 INJECTION, SOLUTION INTRAVENOUS AS NEEDED
Status: DISCONTINUED | OUTPATIENT
Start: 2022-01-01 | End: 2022-01-01 | Stop reason: HOSPADM

## 2022-01-01 RX ORDER — HYDRALAZINE HYDROCHLORIDE 100 MG/1
100 TABLET, FILM COATED ORAL 3 TIMES DAILY
Qty: 90 TABLET | Refills: 0 | Status: SHIPPED | OUTPATIENT
Start: 2022-01-01 | End: 2022-01-01

## 2022-01-01 RX ORDER — ADHESIVE BANDAGE
30 BANDAGE TOPICAL DAILY PRN
Status: DISCONTINUED | OUTPATIENT
Start: 2022-01-01 | End: 2022-01-01 | Stop reason: HOSPADM

## 2022-01-01 RX ORDER — SEVELAMER CARBONATE 800 MG/1
1600 TABLET, FILM COATED ORAL
COMMUNITY

## 2022-01-01 RX ORDER — HEPARIN SODIUM 10000 [USP'U]/100ML
11-25 INJECTION, SOLUTION INTRAVENOUS
Status: DISCONTINUED | OUTPATIENT
Start: 2022-01-01 | End: 2022-01-01 | Stop reason: HOSPADM

## 2022-01-01 RX ORDER — SODIUM BICARBONATE 1 MEQ/ML
SYRINGE (ML) INTRAVENOUS
Status: COMPLETED | OUTPATIENT
Start: 2022-01-01 | End: 2022-01-01

## 2022-01-01 RX ORDER — LEVOTHYROXINE SODIUM 75 UG/1
75 TABLET ORAL
COMMUNITY

## 2022-01-01 RX ORDER — SODIUM CHLORIDE 0.9 % (FLUSH) 0.9 %
5-40 SYRINGE (ML) INJECTION EVERY 8 HOURS
Status: DISCONTINUED | OUTPATIENT
Start: 2022-01-01 | End: 2022-01-01 | Stop reason: HOSPADM

## 2022-01-01 RX ORDER — HEPARIN SODIUM 1000 [USP'U]/ML
2000 INJECTION, SOLUTION INTRAVENOUS; SUBCUTANEOUS AS NEEDED
Status: DISCONTINUED | OUTPATIENT
Start: 2022-01-01 | End: 2022-01-01 | Stop reason: HOSPADM

## 2022-01-01 RX ORDER — HEPARIN SODIUM 1000 [USP'U]/ML
4000 INJECTION, SOLUTION INTRAVENOUS; SUBCUTANEOUS ONCE
Status: COMPLETED | OUTPATIENT
Start: 2022-01-01 | End: 2022-01-01

## 2022-01-01 RX ORDER — CLONIDINE HYDROCHLORIDE 0.1 MG/1
0.1 TABLET ORAL
Status: DISCONTINUED | OUTPATIENT
Start: 2022-01-01 | End: 2022-01-01 | Stop reason: HOSPADM

## 2022-01-01 RX ORDER — ACETAMINOPHEN 650 MG/1
650 SUPPOSITORY RECTAL
Status: DISCONTINUED | OUTPATIENT
Start: 2022-01-01 | End: 2022-01-01 | Stop reason: HOSPADM

## 2022-01-01 RX ORDER — POLYETHYLENE GLYCOL 3350 17 G/17G
17 POWDER, FOR SOLUTION ORAL DAILY
Status: DISCONTINUED | OUTPATIENT
Start: 2022-01-01 | End: 2022-01-01 | Stop reason: HOSPADM

## 2022-01-01 RX ORDER — AMLODIPINE BESYLATE 10 MG/1
10 TABLET ORAL DAILY
Qty: 30 TABLET | Refills: 0 | Status: SHIPPED | OUTPATIENT
Start: 2022-01-01 | End: 2022-01-01

## 2022-01-01 RX ORDER — METOPROLOL SUCCINATE 50 MG/1
50 TABLET, EXTENDED RELEASE ORAL DAILY
Status: DISCONTINUED | OUTPATIENT
Start: 2022-01-01 | End: 2022-01-01 | Stop reason: HOSPADM

## 2022-01-01 RX ORDER — DEXAMETHASONE SODIUM PHOSPHATE 100 MG/10ML
10 INJECTION INTRAMUSCULAR; INTRAVENOUS ONCE
Status: COMPLETED | OUTPATIENT
Start: 2022-01-01 | End: 2022-01-01

## 2022-01-01 RX ORDER — SODIUM CHLORIDE 0.9 % (FLUSH) 0.9 %
5-40 SYRINGE (ML) INJECTION EVERY 8 HOURS
Status: DISCONTINUED | OUTPATIENT
Start: 2022-01-01 | End: 2022-01-01

## 2022-01-01 RX ORDER — HEPARIN SODIUM 1000 [USP'U]/ML
1000 INJECTION, SOLUTION INTRAVENOUS; SUBCUTANEOUS
COMMUNITY

## 2022-01-01 RX ORDER — SEVELAMER CARBONATE 800 MG/1
1600 TABLET, FILM COATED ORAL
Status: DISCONTINUED | OUTPATIENT
Start: 2022-01-01 | End: 2022-01-01 | Stop reason: HOSPADM

## 2022-01-01 RX ORDER — HYDRALAZINE HYDROCHLORIDE 50 MG/1
100 TABLET, FILM COATED ORAL 3 TIMES DAILY
Status: DISCONTINUED | OUTPATIENT
Start: 2022-01-01 | End: 2022-01-01 | Stop reason: HOSPADM

## 2022-01-01 RX ORDER — AMOXICILLIN AND CLAVULANATE POTASSIUM 500; 125 MG/1; MG/1
1 TABLET, FILM COATED ORAL EVERY 12 HOURS
Qty: 14 TABLET | Refills: 0 | Status: SHIPPED | OUTPATIENT
Start: 2022-01-01 | End: 2022-01-01

## 2022-01-01 RX ORDER — LOSARTAN POTASSIUM 50 MG/1
50 TABLET ORAL DAILY
Qty: 30 TABLET | Refills: 0 | Status: SHIPPED | OUTPATIENT
Start: 2022-01-01 | End: 2022-01-01

## 2022-01-01 RX ORDER — DEXTROSE MONOHYDRATE 100 MG/ML
125 INJECTION, SOLUTION INTRAVENOUS CONTINUOUS
Status: DISCONTINUED | OUTPATIENT
Start: 2022-01-01 | End: 2022-01-01 | Stop reason: HOSPADM

## 2022-01-01 RX ORDER — ACETAMINOPHEN 325 MG/1
650 TABLET ORAL 3 TIMES DAILY
Status: DISCONTINUED | OUTPATIENT
Start: 2022-01-01 | End: 2022-01-01 | Stop reason: HOSPADM

## 2022-01-01 RX ORDER — NOREPINEPHRINE BIT/0.9 % NACL 8 MG/250ML
.5-3 INFUSION BOTTLE (ML) INTRAVENOUS
Status: DISCONTINUED | OUTPATIENT
Start: 2022-01-01 | End: 2022-01-01 | Stop reason: HOSPADM

## 2022-01-01 RX ORDER — AMLODIPINE BESYLATE 5 MG/1
10 TABLET ORAL DAILY
Status: DISCONTINUED | OUTPATIENT
Start: 2022-01-01 | End: 2022-01-01 | Stop reason: HOSPADM

## 2022-01-01 RX ORDER — LORAZEPAM 2 MG/ML
0.5 INJECTION INTRAMUSCULAR ONCE
Status: COMPLETED | OUTPATIENT
Start: 2022-01-01 | End: 2022-01-01

## 2022-01-01 RX ORDER — FENTANYL CITRATE 50 UG/ML
INJECTION, SOLUTION INTRAMUSCULAR; INTRAVENOUS
Status: DISCONTINUED
Start: 2022-01-01 | End: 2022-01-01 | Stop reason: HOSPADM

## 2022-01-01 RX ORDER — DEXTROSE 20 G/100ML
INJECTION, SOLUTION INTRAVENOUS CONTINUOUS
Status: DISCONTINUED | OUTPATIENT
Start: 2022-01-01 | End: 2022-01-01 | Stop reason: HOSPADM

## 2022-01-01 RX ORDER — ISOSORBIDE MONONITRATE 30 MG/1
30 TABLET, EXTENDED RELEASE ORAL
Status: DISCONTINUED | OUTPATIENT
Start: 2022-01-01 | End: 2022-01-01 | Stop reason: HOSPADM

## 2022-01-01 RX ORDER — ACETAMINOPHEN 325 MG/1
650 TABLET ORAL 3 TIMES DAILY
COMMUNITY

## 2022-01-01 RX ORDER — AZITHROMYCIN 500 MG/1
500 TABLET, FILM COATED ORAL
Status: DISCONTINUED | OUTPATIENT
Start: 2022-01-01 | End: 2022-01-01

## 2022-01-01 RX ORDER — AMOXICILLIN 250 MG
1 CAPSULE ORAL 2 TIMES DAILY
Status: DISCONTINUED | OUTPATIENT
Start: 2022-01-01 | End: 2022-01-01 | Stop reason: HOSPADM

## 2022-01-01 RX ORDER — CINACALCET 30 MG/1
120 TABLET, FILM COATED ORAL
COMMUNITY

## 2022-01-01 RX ORDER — SODIUM CHLORIDE 9 MG/ML
75 INJECTION, SOLUTION INTRAVENOUS CONTINUOUS
Status: DISCONTINUED | OUTPATIENT
Start: 2022-01-01 | End: 2022-01-01 | Stop reason: HOSPADM

## 2022-01-01 RX ORDER — LEVOTHYROXINE SODIUM 75 UG/1
75 TABLET ORAL
Status: DISCONTINUED | OUTPATIENT
Start: 2022-01-01 | End: 2022-01-01 | Stop reason: HOSPADM

## 2022-01-01 RX ORDER — IPRATROPIUM BROMIDE AND ALBUTEROL SULFATE 2.5; .5 MG/3ML; MG/3ML
3 SOLUTION RESPIRATORY (INHALATION)
Status: DISCONTINUED | OUTPATIENT
Start: 2022-01-01 | End: 2022-01-01 | Stop reason: HOSPADM

## 2022-01-01 RX ADMIN — METOPROLOL SUCCINATE 50 MG: 50 TABLET, FILM COATED, EXTENDED RELEASE ORAL at 10:51

## 2022-01-01 RX ADMIN — ACETAMINOPHEN 650 MG: 325 TABLET, FILM COATED ORAL at 21:29

## 2022-01-01 RX ADMIN — LOSARTAN POTASSIUM 50 MG: 50 TABLET, FILM COATED ORAL at 12:55

## 2022-01-01 RX ADMIN — SODIUM CHLORIDE, PRESERVATIVE FREE 5 ML: 5 INJECTION INTRAVENOUS at 14:37

## 2022-01-01 RX ADMIN — SEVELAMER CARBONATE 1600 MG: 800 TABLET, FILM COATED ORAL at 16:01

## 2022-01-01 RX ADMIN — LEVOTHYROXINE SODIUM 75 MCG: 0.07 TABLET ORAL at 08:29

## 2022-01-01 RX ADMIN — DEXTROSE MONOHYDRATE 250 ML: 100 INJECTION, SOLUTION INTRAVENOUS at 21:43

## 2022-01-01 RX ADMIN — SODIUM CHLORIDE, PRESERVATIVE FREE 10 ML: 5 INJECTION INTRAVENOUS at 00:40

## 2022-01-01 RX ADMIN — FAMOTIDINE 10 MG: 10 TABLET ORAL at 08:29

## 2022-01-01 RX ADMIN — SODIUM CHLORIDE, PRESERVATIVE FREE 10 ML: 5 INJECTION INTRAVENOUS at 21:34

## 2022-01-01 RX ADMIN — ISOSORBIDE MONONITRATE 30 MG: 30 TABLET, EXTENDED RELEASE ORAL at 08:04

## 2022-01-01 RX ADMIN — SODIUM CHLORIDE, PRESERVATIVE FREE 10 ML: 5 INJECTION INTRAVENOUS at 05:27

## 2022-01-01 RX ADMIN — HEPARIN SODIUM 5000 UNITS: 5000 INJECTION INTRAVENOUS; SUBCUTANEOUS at 14:37

## 2022-01-01 RX ADMIN — HYDRALAZINE HYDROCHLORIDE 100 MG: 50 TABLET, FILM COATED ORAL at 21:15

## 2022-01-01 RX ADMIN — EPINEPHRINE 1 MG: 0.1 INJECTION INTRACARDIAC; INTRAVENOUS at 05:34

## 2022-01-01 RX ADMIN — CARVEDILOL 25 MG: 12.5 TABLET, FILM COATED ORAL at 21:30

## 2022-01-01 RX ADMIN — FAMOTIDINE 10 MG: 10 TABLET ORAL at 21:30

## 2022-01-01 RX ADMIN — CARVEDILOL 25 MG: 12.5 TABLET, FILM COATED ORAL at 08:29

## 2022-01-01 RX ADMIN — EPINEPHRINE 1 MG: 0.1 INJECTION INTRACARDIAC; INTRAVENOUS at 06:14

## 2022-01-01 RX ADMIN — ACETAMINOPHEN 650 MG: 325 TABLET, FILM COATED ORAL at 18:17

## 2022-01-01 RX ADMIN — ATORVASTATIN CALCIUM 40 MG: 40 TABLET, FILM COATED ORAL at 08:29

## 2022-01-01 RX ADMIN — LORAZEPAM 0.5 MG: 2 INJECTION INTRAMUSCULAR; INTRAVENOUS at 21:18

## 2022-01-01 RX ADMIN — HEPARIN SODIUM 5000 UNITS: 5000 INJECTION INTRAVENOUS; SUBCUTANEOUS at 05:30

## 2022-01-01 RX ADMIN — CALCIUM CHLORIDE 1 G: 100 INJECTION, SOLUTION INTRAVENOUS; INTRAVENTRICULAR at 06:15

## 2022-01-01 RX ADMIN — ACETAMINOPHEN 650 MG: 325 TABLET, FILM COATED ORAL at 18:00

## 2022-01-01 RX ADMIN — PIPERACILLIN AND TAZOBACTAM 3.38 G: 3; .375 INJECTION, POWDER, LYOPHILIZED, FOR SOLUTION INTRAVENOUS at 17:09

## 2022-01-01 RX ADMIN — CARVEDILOL 25 MG: 12.5 TABLET, FILM COATED ORAL at 08:04

## 2022-01-01 RX ADMIN — SODIUM BICARBONATE 1 MEQ: 84 INJECTION, SOLUTION INTRAVENOUS at 06:17

## 2022-01-01 RX ADMIN — IOPAMIDOL 100 ML: 755 INJECTION, SOLUTION INTRAVENOUS at 23:27

## 2022-01-01 RX ADMIN — VANCOMYCIN HYDROCHLORIDE 1500 MG: 10 INJECTION, POWDER, LYOPHILIZED, FOR SOLUTION INTRAVENOUS at 14:35

## 2022-01-01 RX ADMIN — LEVOTHYROXINE SODIUM 75 MCG: 0.07 TABLET ORAL at 08:04

## 2022-01-01 RX ADMIN — DEXTROSE MONOHYDRATE 50 ML: 25 INJECTION, SOLUTION INTRAVENOUS at 05:37

## 2022-01-01 RX ADMIN — SEVELAMER CARBONATE 1600 MG: 800 TABLET, FILM COATED ORAL at 18:17

## 2022-01-01 RX ADMIN — DEXTROSE MONOHYDRATE 250 ML: 100 INJECTION, SOLUTION INTRAVENOUS at 22:30

## 2022-01-01 RX ADMIN — DOXYCYCLINE 100 MG: 100 INJECTION, POWDER, LYOPHILIZED, FOR SOLUTION INTRAVENOUS at 14:23

## 2022-01-01 RX ADMIN — CALCIUM CHLORIDE 1 G: 100 INJECTION, SOLUTION INTRAVENOUS; INTRAVENTRICULAR at 05:38

## 2022-01-01 RX ADMIN — HEPARIN SODIUM 5000 UNITS: 5000 INJECTION INTRAVENOUS; SUBCUTANEOUS at 21:30

## 2022-01-01 RX ADMIN — ACETAMINOPHEN 650 MG: 325 TABLET, FILM COATED ORAL at 08:04

## 2022-01-01 RX ADMIN — HEPARIN SODIUM 4000 UNITS: 1000 INJECTION, SOLUTION INTRAVENOUS; SUBCUTANEOUS at 22:52

## 2022-01-01 RX ADMIN — HEPARIN SODIUM 5000 UNITS: 5000 INJECTION INTRAVENOUS; SUBCUTANEOUS at 06:23

## 2022-01-01 RX ADMIN — SODIUM BICARBONATE 1 MEQ: 84 INJECTION, SOLUTION INTRAVENOUS at 05:29

## 2022-01-01 RX ADMIN — EPINEPHRINE 1 MG: 0.1 INJECTION INTRACARDIAC; INTRAVENOUS at 05:37

## 2022-01-01 RX ADMIN — DOXYCYCLINE 100 MG: 100 INJECTION, POWDER, LYOPHILIZED, FOR SOLUTION INTRAVENOUS at 04:12

## 2022-01-01 RX ADMIN — SEVELAMER CARBONATE 1600 MG: 800 TABLET, FILM COATED ORAL at 18:00

## 2022-01-01 RX ADMIN — ACETAMINOPHEN 650 MG: 325 TABLET, FILM COATED ORAL at 08:29

## 2022-01-01 RX ADMIN — HEPARIN SODIUM 5000 UNITS: 5000 INJECTION INTRAVENOUS; SUBCUTANEOUS at 13:00

## 2022-01-01 RX ADMIN — ASPIRIN 81 MG: 81 TABLET, COATED ORAL at 08:29

## 2022-01-01 RX ADMIN — FENTANYL CITRATE 150 MCG: 50 INJECTION, SOLUTION INTRAMUSCULAR; INTRAVENOUS at 05:51

## 2022-01-01 RX ADMIN — ACETAMINOPHEN 650 MG: 325 TABLET ORAL at 02:38

## 2022-01-01 RX ADMIN — DEXTROSE MONOHYDRATE 250 ML: 10 INJECTION, SOLUTION INTRAVENOUS at 22:18

## 2022-01-01 RX ADMIN — PIPERACILLIN AND TAZOBACTAM 3.38 G: 3; .375 INJECTION, POWDER, LYOPHILIZED, FOR SOLUTION INTRAVENOUS at 12:47

## 2022-01-01 RX ADMIN — DEXTROSE MONOHYDRATE 125 ML/HR: 100 INJECTION, SOLUTION INTRAVENOUS at 23:53

## 2022-01-01 RX ADMIN — ISOSORBIDE MONONITRATE 30 MG: 30 TABLET, EXTENDED RELEASE ORAL at 08:29

## 2022-01-01 RX ADMIN — EPINEPHRINE 1 MG: 0.1 INJECTION INTRACARDIAC; INTRAVENOUS at 05:27

## 2022-01-01 RX ADMIN — FAMOTIDINE 10 MG: 10 TABLET ORAL at 21:26

## 2022-01-01 RX ADMIN — SODIUM CHLORIDE, PRESERVATIVE FREE 10 ML: 5 INJECTION INTRAVENOUS at 05:31

## 2022-01-01 RX ADMIN — SEVELAMER CARBONATE 1600 MG: 800 TABLET, FILM COATED ORAL at 08:29

## 2022-01-01 RX ADMIN — SODIUM BICARBONATE 1 MEQ: 84 INJECTION, SOLUTION INTRAVENOUS at 05:36

## 2022-01-01 RX ADMIN — DEXAMETHASONE SODIUM PHOSPHATE 10 MG: 10 INJECTION INTRAMUSCULAR; INTRAVENOUS at 05:51

## 2022-01-01 RX ADMIN — PIPERACILLIN AND TAZOBACTAM 3.38 G: 3; .375 INJECTION, POWDER, LYOPHILIZED, FOR SOLUTION INTRAVENOUS at 06:33

## 2022-01-01 RX ADMIN — SODIUM CHLORIDE, PRESERVATIVE FREE 10 ML: 5 INJECTION INTRAVENOUS at 05:49

## 2022-01-01 RX ADMIN — AMLODIPINE BESYLATE 10 MG: 5 TABLET ORAL at 10:51

## 2022-01-01 RX ADMIN — ACETAMINOPHEN 650 MG: 325 TABLET, FILM COATED ORAL at 16:01

## 2022-01-01 RX ADMIN — SEVELAMER CARBONATE 1600 MG: 800 TABLET, FILM COATED ORAL at 11:51

## 2022-01-01 RX ADMIN — HEPARIN SODIUM 5000 UNITS: 5000 INJECTION INTRAVENOUS; SUBCUTANEOUS at 21:27

## 2022-01-01 RX ADMIN — TAMSULOSIN HYDROCHLORIDE 0.4 MG: 0.4 CAPSULE ORAL at 08:29

## 2022-01-01 RX ADMIN — HYDRALAZINE HYDROCHLORIDE 100 MG: 50 TABLET, FILM COATED ORAL at 21:14

## 2022-01-01 RX ADMIN — HEPARIN SODIUM 11 UNITS/KG/HR: 10000 INJECTION, SOLUTION INTRAVENOUS at 22:51

## 2022-01-01 RX ADMIN — CALCIUM CHLORIDE 1 G: 100 INJECTION, SOLUTION INTRAVENOUS; INTRAVENTRICULAR at 05:35

## 2022-01-01 RX ADMIN — HEPARIN SODIUM 5000 UNITS: 5000 INJECTION INTRAVENOUS; SUBCUTANEOUS at 21:15

## 2022-01-01 RX ADMIN — ACETAMINOPHEN 650 MG: 325 TABLET, FILM COATED ORAL at 21:26

## 2022-01-01 RX ADMIN — METOPROLOL SUCCINATE 50 MG: 50 TABLET, FILM COATED, EXTENDED RELEASE ORAL at 12:55

## 2022-01-01 RX ADMIN — CLONIDINE HYDROCHLORIDE 0.1 MG: 0.1 TABLET ORAL at 02:38

## 2022-01-01 RX ADMIN — AMLODIPINE BESYLATE 10 MG: 5 TABLET ORAL at 12:55

## 2022-01-01 RX ADMIN — DOXYCYCLINE 100 MG: 100 INJECTION, POWDER, LYOPHILIZED, FOR SOLUTION INTRAVENOUS at 17:29

## 2022-01-01 RX ADMIN — EPINEPHRINE 1 MG: 0.1 INJECTION INTRACARDIAC; INTRAVENOUS at 05:31

## 2022-01-01 RX ADMIN — CARVEDILOL 25 MG: 12.5 TABLET, FILM COATED ORAL at 21:26

## 2022-01-01 RX ADMIN — SODIUM BICARBONATE 2 MEQ: 84 INJECTION, SOLUTION INTRAVENOUS at 06:19

## 2022-01-01 RX ADMIN — DEXTROSE MONOHYDRATE 50 ML: 25 INJECTION, SOLUTION INTRAVENOUS at 05:41

## 2022-01-01 RX ADMIN — TAMSULOSIN HYDROCHLORIDE 0.4 MG: 0.4 CAPSULE ORAL at 08:04

## 2022-01-01 RX ADMIN — FAMOTIDINE 10 MG: 10 TABLET ORAL at 08:04

## 2022-01-01 RX ADMIN — ATORVASTATIN CALCIUM 40 MG: 40 TABLET, FILM COATED ORAL at 08:04

## 2022-01-01 RX ADMIN — ASPIRIN 81 MG CHEWABLE TABLET 324 MG: 81 TABLET CHEWABLE at 12:02

## 2022-01-01 RX ADMIN — ASPIRIN 81 MG: 81 TABLET, COATED ORAL at 08:04

## 2022-01-01 RX ADMIN — CALCIUM CHLORIDE 1 G: 100 INJECTION, SOLUTION INTRAVENOUS; INTRAVENTRICULAR at 05:29

## 2022-01-01 RX ADMIN — DEXTROSE MONOHYDRATE 250 ML: 10 INJECTION, SOLUTION INTRAVENOUS at 00:43

## 2022-01-01 RX ADMIN — SODIUM CHLORIDE, PRESERVATIVE FREE 10 ML: 5 INJECTION INTRAVENOUS at 14:24

## 2022-01-01 RX ADMIN — SODIUM BICARBONATE 1 MEQ: 84 INJECTION, SOLUTION INTRAVENOUS at 05:30

## 2022-01-01 RX ADMIN — HYDRALAZINE HYDROCHLORIDE 100 MG: 50 TABLET, FILM COATED ORAL at 15:39

## 2022-01-01 RX ADMIN — EPINEPHRINE 1 MG: 0.1 INJECTION INTRACARDIAC; INTRAVENOUS at 06:17

## 2022-01-01 RX ADMIN — Medication 4 MCG/MIN: at 01:22

## 2022-01-01 RX ADMIN — PIPERACILLIN AND TAZOBACTAM 3.38 G: 3; .375 INJECTION, POWDER, LYOPHILIZED, FOR SOLUTION INTRAVENOUS at 02:35

## 2022-01-01 RX ADMIN — HYDRALAZINE HYDROCHLORIDE 100 MG: 50 TABLET, FILM COATED ORAL at 10:51

## 2022-01-01 RX ADMIN — DEXTROSE MONOHYDRATE 250 ML: 10 INJECTION, SOLUTION INTRAVENOUS at 23:37

## 2022-01-01 RX ADMIN — HYDRALAZINE HYDROCHLORIDE 100 MG: 50 TABLET, FILM COATED ORAL at 17:29

## 2022-01-01 RX ADMIN — LOSARTAN POTASSIUM 50 MG: 50 TABLET, FILM COATED ORAL at 10:51

## 2022-01-01 RX ADMIN — DEXTROSE MONOHYDRATE 50 ML: 25 INJECTION, SOLUTION INTRAVENOUS at 05:32

## 2022-01-01 RX ADMIN — SEVELAMER CARBONATE 1600 MG: 800 TABLET, FILM COATED ORAL at 08:04

## 2022-01-01 RX ADMIN — HEPARIN SODIUM 5000 UNITS: 5000 INJECTION INTRAVENOUS; SUBCUTANEOUS at 14:23

## 2022-01-01 RX ADMIN — HEPARIN SODIUM 5000 UNITS: 5000 INJECTION INTRAVENOUS; SUBCUTANEOUS at 05:49

## 2022-01-01 RX ADMIN — CALCIUM CHLORIDE 1 G: 100 INJECTION, SOLUTION INTRAVENOUS; INTRAVENTRICULAR at 05:31

## 2022-01-01 RX ADMIN — SODIUM CHLORIDE, PRESERVATIVE FREE 5 ML: 5 INJECTION INTRAVENOUS at 21:31

## 2022-01-01 RX ADMIN — DOXYCYCLINE 100 MG: 100 INJECTION, POWDER, LYOPHILIZED, FOR SOLUTION INTRAVENOUS at 05:14

## 2022-01-01 RX ADMIN — SODIUM BICARBONATE 1 MEQ: 84 INJECTION, SOLUTION INTRAVENOUS at 06:22

## 2022-03-23 PROBLEM — R50.9 FEVER: Status: ACTIVE | Noted: 2022-01-01

## 2022-03-23 NOTE — PROGRESS NOTES
Reason for Admission:  Fever                     RUR Score:   8%                 Plan for utilizing home health: None @ this time. Pt uses no DME - awaiting tgerapy evals/recs. Pt signed Choice Letter to continue dialysis with [facility] on MWF. Referral sent via [employer]. Name of Practice: Sees MDs @ [facility]   Are you a current patient: Yes/No: Yes   Approximate date of last visit: Seen a month ago. Can you participate in a virtual visit with your PCP: Yes/Call/Has cell phone. Current Advanced Directive/Advance Care Plan: Full Code      Healthcare Decision Maker:   Primary HCDM is shai Tan @ [phone]. Transition of Care Plan: D/C Plan is home to boarding house with brother & pt will need transportation/cab & dialysis @ [facility] on MWF via transportation.

## 2022-03-23 NOTE — PROGRESS NOTES
Problem: Falls - Risk of  Goal: *Absence of Falls  Description: Document Malakia Moran Fall Risk and appropriate interventions in the flowsheet.   Outcome: Progressing Towards Goal  Note: Fall Risk Interventions:            Medication Interventions: Bed/chair exit alarm                   Problem: Patient Education: Go to Patient Education Activity  Goal: Patient/Family Education  Outcome: Progressing Towards Goal

## 2022-03-23 NOTE — ED PROVIDER NOTES
EMERGENCY DEPARTMENT HISTORY AND PHYSICAL EXAM      Date: 3/23/2022  Patient Name: Viviana Duncan      History of Presenting Illness     Chief Complaint   Patient presents with    Hypertension    Fatigue       History Provided By: Patient    HPI: Viviana Duncan, 79 y.o. male with a past medical history significant for ESRD on HD MWF presents to the ED with cc of fatigue. Pt had full session of dialysis. States they did not take off much if any fluid today, usually take off 1-2L. After they stated he \"didn't look right\" so sent him to ED for evaluation. Pt endorsing fatigue and feeling sleepy, having no energy, which he states does not usually happen after HD. Denies focal weakness, numbness, tingling, increased cough, F/C/N/V/D, CP, SOB, abd pain. There are no other complaints, changes, or physical findings at this time. PCP: None    Current Facility-Administered Medications   Medication Dose Route Frequency Provider Last Rate Last Admin    vancomycin (VANCOCIN) 1,480 mg in 0.9% sodium chloride 250 mL IVPB  20 mg/kg IntraVENous ONCE Fariha Anne MD        piperacillin-tazobactam (ZOSYN) 3.375 g in 0.9% sodium chloride (MBP/ADV) 100 mL MBP  3.375 g IntraVENous Q8H Mateo Perez MD           Past History     Past Medical History:  No past medical history on file. Past Surgical History:  No past surgical history on file. Family History:  No family history on file. Social History:  Social History     Tobacco Use    Smoking status: Not on file    Smokeless tobacco: Not on file   Substance Use Topics    Alcohol use: Not on file    Drug use: Not on file       Allergies:  No Known Allergies      Review of Systems   Constitutional: Negative except as in HPI. Eyes: Negative except as in HPI.  ENT: Negative except as in HPI. Cardiovascular: Negative except as in HPI. Respiratory: Negative except as in HPI. Gastrointestinal: Negative except as in HPI.   Genitourinary: Negative except as in HPI.  Musculoskeletal: Negative except as in HPI. Integumentary: Negative except as in HPI. Neurological: Negative except as in HPI. Psychiatric: Negative except as in HPI. Endocrine: Negative except as in HPI. Hematologic/Lymphatic: Negative except as in HPI. Allergic/Immunologic: Negative except as in HPI. Physical Exam   Constitutional: Awake and alert, interactive, NAD, eyes closed, responsive but not opening eyes while speaking. Eyes: PERRL, no injection or scleral icterus, no discharge  HEENT: NCAT, neck supple, MMM, no oropharyngeal exudates  CV: RRR, no m/r/g  Respiratory: CTAB, no r/r/w  GI: Abd soft, nondistended, nontender  : Deferred  MSK: no joint effusions or edema  Skin: No rashes  Neuro: CN2-12 intact, symmetric facies, fluent speech. Psych: Well-groomed, normal speech, behavior, appropriate mood    Lab and Diagnostic Study Results     Labs -     Recent Results (from the past 12 hour(s))   CBC WITH AUTOMATED DIFF    Collection Time: 03/23/22 11:36 AM   Result Value Ref Range    WBC 7.0 4.1 - 11.1 K/uL    RBC 3.71 (L) 4.10 - 5.70 M/uL    HGB 11.8 (L) 12.1 - 17.0 g/dL    HCT 36.3 (L) 36.6 - 50.3 %    MCV 97.8 80.0 - 99.0 FL    MCH 31.8 26.0 - 34.0 PG    MCHC 32.5 30.0 - 36.5 g/dL    RDW 13.7 11.5 - 14.5 %    PLATELET PENDING K/uL    MPV PENDING FL    NRBC 0.0 0.0  WBC    ABSOLUTE NRBC 0.00 0.00 - 0.01 K/uL    NEUTROPHILS 81 (H) 32 - 75 %    LYMPHOCYTES 7 (L) 12 - 49 %    MONOCYTES 11 5 - 13 %    EOSINOPHILS 1 0 - 7 %    BASOPHILS 0 0 - 1 %    IMMATURE GRANULOCYTES 0 0 - 0.5 %    ABS. NEUTROPHILS 5.6 1.8 - 8.0 K/UL    ABS. LYMPHOCYTES 0.5 (L) 0.8 - 3.5 K/UL    ABS. MONOCYTES 0.8 0.0 - 1.0 K/UL    ABS. EOSINOPHILS 0.1 0.0 - 0.4 K/UL    ABS. BASOPHILS 0.0 0.0 - 0.1 K/UL    ABS. IMM.  GRANS. 0.0 0.00 - 0.04 K/UL    DF AUTOMATED     INFLUENZA A & B AG (RAPID TEST)    Collection Time: 03/23/22 11:45 AM   Result Value Ref Range    Influenza A Antigen Negative Negative      Influenza B Antigen Negative Negative     SARS-COV-2    Collection Time: 03/23/22 11:45 AM   Result Value Ref Range    SARS-CoV-2 by PCR Swab     COVID-19 RAPID TEST    Collection Time: 03/23/22 11:45 AM   Result Value Ref Range    COVID-19 rapid test Not Detected Not Detected         Radiologic Studies -   [unfilled]  CT Results  (Last 48 hours)      None          CXR Results  (Last 48 hours)                 03/23/22 1206  XR CHEST PORT Final result    Narrative:  Chest single view. Elevation right hemidiaphragm. Central vessel crowding likely in part   exaggerated by reduced lung volumes. No gross interstitial or alveolar pulmonary   edema. Left-sided cardiac device with leads overlying the heart. Cardiac   silhouette within normal limits. Thoracic aorta atherosclerosis. No pneumothorax   or sizable pleural effusion. Medical Decision Making and ED Course   - I am the first and primary provider for this patient AND AM THE PRIMARY PROVIDER OF RECORD. - I reviewed the vital signs, available nursing notes, past medical history, past surgical history, family history and social history. - Initial assessment performed. The patients presenting problems have been discussed, and the staff are in agreement with the care plan formulated and outlined with them. I have encouraged them to ask questions as they arise throughout their visit. Vital Signs-Reviewed the patient's vital signs. Patient Vitals for the past 12 hrs:   Temp Pulse Resp BP SpO2   03/23/22 1146 -- -- -- (!) 200/102 --   03/23/22 1127 (!) 100.5 °F (38.1 °C) 85 (!) 32 -- 96 %       EKG interpretation:         Provider Notes (Medical Decision Making):   67M w/fatigue, fever, tachypnea. Possibly fluid overload, however CXR not consistent. Suspect infx, possibly sepsis/bacteremia particularly given immunocompromise of ESRD and frequent IVs. Will cover w/broad spectrum abx and get CT Chest to better eval for PNA. Dispo admit.     ED Course: ED Course as of 03/23/22 1725   Wed Mar 23, 2022   1230 Temp(!): 100.5 °F (38.1 °C) [YA]   1325 Troponin-High Sensitivity: 58 [YA]   1325 HGB(!): 11.8 [YA]   1325 Potassium: 3.9 [YA]   1325 COVID-19 rapid test: Not Detected [YA]   1325 Influenza A Antigen: Negative [YA]   1325 Lactic acid: 1.5 [YA]   7512 Admitted to Dr. Jazmin Terry. [YA]      ED Course User Index  [YA] Ivan Arriaza MD         Consultations:     Hospitalist Dr. Jazmin Terry. Disposition     Disposition: Admitted to Floor Medical Floor the case was discussed with the admitting physician Dr. Jazmin Terry. Admitted      Diagnosis     Clinical Impression:   1. Fatigue, unspecified type        Attestations:     Kailyn Camp MD

## 2022-03-23 NOTE — PROGRESS NOTES
3/23/22. Pt is seen by PCP @ [facility] - seen last month. D/C Plan is home to boarding house with brother via cab/transportation. Pt uses no DME/no home health @ this time - pt awaiting therapy evals/recs. Pt signed Choice Letter to [facility] M[facility] via transportation. Referral sent via [employer].

## 2022-03-23 NOTE — PROGRESS NOTES
Two person skin assessment done by Baron Cancino RN and Manuel Baron RN. Patient skin is dry, warm and intact. Patient has scabs to buttocks. Dialysis fistula to left arm, and a pacemaker to the left side.

## 2022-03-23 NOTE — H&P
History and Physical    Patient: Sophie Johnson MRN: [id number]  SSN: [id number]    YOB: 1955  Age: 79 y.o. Sex: male      Subjective:      Sopihe Johnson is a 79 y.o. male with a history of end-stage renal disease on hemodialysis Monday, Wednesday, Friday and hypertension that presented to the emergency room on 3/23/2022 for feeling fatigued. He had a full dialysis session today but they did not take any fluid off. Afterward he stated that he just did not feel right and presented to the ED for further evaluation. He admits to feeling fatigued, having no energy. Denies any focal weakness, numbness, tingling, increased shortness of breath, cough, fever, chills, nausea, vomiting. Overall patient is a poor historian. In the ED patient was found to be hypertensive with a low-grade fever. WBC within normal limits. Lactic acid 1.5. BNP greater than 35,000. Influenza negative rapid Covid negative. CT of the chest showed nonspecific groundglass sites range nodular-like to geographic scattered through the lungs. Oxygen saturation within normal limits on room air. It was recommended admission for fever possibly related to pneumonia    No past medical history on file. End-stage renal disease on hemodialysis  No past surgical history on file. Pacemaker  No family history on file. High blood pressure  Social History     Tobacco Use    Smoking status: Not on file    Smokeless tobacco: Not on file   Substance Use Topics    Alcohol use: Not on file      Prior to Admission medications    Not on File   Patient does not know his home medications. He gets his treatment and pharmacy is at the [address] [place] [address].   We will have to contact patient's pharmacy    No Known Allergies    Review of Systems:  Constitutional: No fevers, No chills, ++fatigue, No weakness  Eyes: No visual disturbance  Ears, Nose, Mouth, Throat, and Face: No nasal congestion, No sore throat  Respiratory: No cough, No sputum, No wheezing, No SOB  Cardiovascular: No chest pain, No lower extremity edema, No Palpitations   Gastrointestinal: No nausea, No vomiting, No diarrhea, No constipation, No abdominal pain  Genitourinary: No frequency, No dysuria, No hematuria  Integument/Breast: No rash, No skin lesion(s), No dryness  Musculoskeletal: No arthralgias, No neck pain, No back pain  Neurological: No headaches, No dizziness, No confusion,  No seizures  Behavioral/Psychiatric: No anxiety, No depression      Objective:     Vitals:    03/23/22 1127 03/23/22 1146 03/23/22 1246   BP:  (!) 200/102 (!) 190/97   Pulse: 85  68   Resp: (!) 32  15   Temp: (!) 100.5 °F (38.1 °C)  100.2 °F (37.9 °C)   SpO2: 96%  96%   Weight: 74 kg (163 lb 2.3 oz)     Height: 6' 1\" (1.854 m)          Physical Exam:  General: alert, cooperative, no distress  Eye: conjunctivae/corneas clear. PERRL, EOM's intact. Throat and Neck: normal and no erythema or exudates noted. No mass   Lung: clear to auscultation bilaterally  Heart: regular rate and rhythm,   Abdomen: soft, non-tender. Bowel sounds normal. No masses,  Extremities:  able to move all extremities normal, atraumatic  Skin: Normal.  Neurologic: AOx3. Cranial nerves 2-12 and sensation grossly intact.   Psychiatric: non focal    Recent Results (from the past 24 hour(s))   TROPONIN-HIGH SENSITIVITY    Collection Time: 03/23/22 11:30 AM   Result Value Ref Range    Troponin-High Sensitivity 58 0 - 76 ng/L   CBC WITH AUTOMATED DIFF    Collection Time: 03/23/22 11:36 AM   Result Value Ref Range    WBC 7.0 4.1 - 11.1 K/uL    RBC 3.71 (L) 4.10 - 5.70 M/uL    HGB 11.8 (L) 12.1 - 17.0 g/dL    HCT 36.3 (L) 36.6 - 50.3 %    MCV 97.8 80.0 - 99.0 FL    MCH 31.8 26.0 - 34.0 PG    MCHC 32.5 30.0 - 36.5 g/dL    RDW 13.7 11.5 - 14.5 %    PLATELET 706 (L) 778 - 400 K/uL    NRBC 0.0 0.0  WBC    ABSOLUTE NRBC 0.00 0.00 - 0.01 K/uL    NEUTROPHILS 81 (H) 32 - 75 %    LYMPHOCYTES 7 (L) 12 - 49 %    MONOCYTES 11 5 - 13 %    EOSINOPHILS 1 0 - 7 % BASOPHILS 0 0 - 1 %    IMMATURE GRANULOCYTES 0 0 - 0.5 %    ABS. NEUTROPHILS 5.6 1.8 - 8.0 K/UL    ABS. LYMPHOCYTES 0.5 (L) 0.8 - 3.5 K/UL    ABS. MONOCYTES 0.8 0.0 - 1.0 K/UL    ABS. EOSINOPHILS 0.1 0.0 - 0.4 K/UL    ABS. BASOPHILS 0.0 0.0 - 0.1 K/UL    ABS. IMM. GRANS. 0.0 0.00 - 0.04 K/UL    DF AUTOMATED     METABOLIC PANEL, COMPREHENSIVE    Collection Time: 03/23/22 11:36 AM   Result Value Ref Range    Sodium 133 (L) 136 - 145 mmol/L    Potassium 3.9 3.5 - 5.1 mmol/L    Chloride 98 97 - 108 mmol/L    CO2 30 21 - 32 mmol/L    Anion gap 5 5 - 15 mmol/L    Glucose 80 65 - 100 mg/dL    BUN 13 6 - 20 mg/dL    Creatinine 4.74 (H) 0.70 - 1.30 mg/dL    BUN/Creatinine ratio 3 (L) 12 - 20      GFR est AA 15 (L) >60 ml/min/1.73m2    GFR est non-AA 12 (L) >60 ml/min/1.73m2    Calcium 8.9 8.5 - 10.1 mg/dL    Bilirubin, total 0.6 0.2 - 1.0 mg/dL    AST (SGOT) 50 (H) 15 - 37 U/L    ALT (SGPT) 15 12 - 78 U/L    Alk. phosphatase 77 45 - 117 U/L    Protein, total 7.5 6.4 - 8.2 g/dL    Albumin 3.3 (L) 3.5 - 5.0 g/dL    Globulin 4.2 (H) 2.0 - 4.0 g/dL    A-G Ratio 0.8 (L) 1.1 - 2.2     NT-PRO BNP    Collection Time: 03/23/22 11:45 AM   Result Value Ref Range    NT pro-BNP >35,000 (H) <125 pg/mL   INFLUENZA A & B AG (RAPID TEST)    Collection Time: 03/23/22 11:45 AM   Result Value Ref Range    Influenza A Antigen Negative Negative      Influenza B Antigen Negative Negative     SARS-COV-2    Collection Time: 03/23/22 11:45 AM   Result Value Ref Range    SARS-CoV-2 by PCR Swab     COVID-19 RAPID TEST    Collection Time: 03/23/22 11:45 AM   Result Value Ref Range    COVID-19 rapid test Not Detected Not Detected     LACTIC ACID    Collection Time: 03/23/22 12:13 PM   Result Value Ref Range    Lactic acid 1.5 0.4 - 2.0 mmol/L       XR Results (maximum last 3): Results from [place] encounter on 03/23/22    XR CHEST PORT    Narrative  Chest single view. Elevation right hemidiaphragm.  Central vessel crowding likely in part  exaggerated by reduced lung volumes. No gross interstitial or alveolar pulmonary  edema. Left-sided cardiac device with leads overlying the heart. Cardiac  silhouette within normal limits. Thoracic aorta atherosclerosis. No pneumothorax  or sizable pleural effusion. CT Results (maximum last 3): Results from [place] encounter on 03/23/22    CT CHEST WO CONT    Narrative  CT chest without IV contrast.    Axial images are reviewed along with reformatted sagittal/coronal/MIP images. No  IV contrast administered. Dose reduction: All CT scans at this facility are performed using dose reduction  optimization techniques as appropriate to a performed exam including the  following-  automated exposure control, adjustments of mA and/or Kv according to patient  size, or use of iterative reconstructive technique. Groundglass sites scattered through the lungs. Some have a nodular appearance  while others are more geographic measuring up to 2 cm. No pleural effusion. Left side cardiac device. Nonenhanced images reveal atherosclerotic change  thoracic aorta. Aortic valvular calcifications, correlate any clinical concern  for valvular stenosis. Tram track calcifications present along the course of  coronary arteries, evidence for coronary artery disease. No pericardial  effusion. Elevation right hemidiaphragm. Cholelithiasis. Atherosclerosis continues into the imaged upper abdominal aorta. Impression  Nonspecific groundglass sites range nodular-like to geographic  scattered through lungs. Differential considerations would include  infectious/inflammatory process including viral etiologies. Neoplastic process  not excluded at this point. Consider follow-up imaging to resolution. No pleural or pericardial effusion. Thoracoabdominal aorta atherosclerosis. CAD. Possible aortic valvular stenosis. Elevation right hemidiaphragm. Cholelithiasis. MRI Results (maximum last 3):   No results found for this or any previous visit. Nuclear Medicine Results (maximum last 3): No results found for this or any previous visit. US Results (maximum last 3): No results found for this or any previous visit. Assessment:   1. Fever most likely pneumonia  2. Uncontrolled hypertension  3. End-stage renal disease on hemodialysis    Plan:   1. Low-grade fever. Tylenol as needed. CT of the chest shows nonspecific groundglass sites scattered throughout the lungs. Consult pulmonology. Start IV Zosyn. Received a dose of IV vancomycin in the emergency room. WBC within normal limits. Lactic acid within normal limits. Influenza a and B are both negative. Covid negative. Blood cultures are pending  2. Blood pressure is uncontrolled. Cardiac telemetry. At this time we do not know his home medications therefore we will start Norvasc 10 mg daily, hydralazine 100mg 3 times a day, Cozaar 50 mg daily, metoprolol 50 mg daily. Clonidine 0.1 mg every 8 hours to keep a systolic pressure under 202. Once home medications have reconciled we will add on home medications. Troponin x 1 negative. Cycle 2 more sets. BNP >35,000  3. Consult nephrology for dialysis management  4. CBC, BMP, inflammatory markers in the a.m.     Home Med Rec will need to be reviewed     Full code    DVT prophylaxis Heparin  GI prophylaxis tolerating oral diet    Total time: 61 min     Signed By: Vaughn Stokes PA-C     March 23, 2022

## 2022-03-23 NOTE — CONSULTS
Full consultation dictated. [id number]  Probable atypical pneumonia. Will add doxycycline for atypical coverage. Check serologies. He is comfortable on room air. Claims to be vaccinated for COVID-19. Influenza A and B and rapid Covid test is negative. His BNP is also severely elevated. Findings on the CT scan may also be due to interstitial edema. We will check an echocardiogram.  He gives a history of heart attack in the past.  Thank you.

## 2022-03-24 NOTE — PROGRESS NOTES
PT eval order received and acknowledged. PT eval attempted at 831am however pt off the floor in CVL. Will continue to follow patient and attempt PT eval at a later time. Thank you.

## 2022-03-24 NOTE — PROGRESS NOTES
OT eval order received and acknowledged. Pt screened and demonstrating baseline independence for self care tasks and functional mobility/transfers, reporting he has has been getting up to the restroom IND and washing up/completing ADLs without difficulty during hospital stay. Pt reports no need for skilled OT services at this time. OT evaluation order will therefore be discontinued as pt has no acute OT needs. Please reorder OT if pt functional status changes. Thank you.

## 2022-03-24 NOTE — PROGRESS NOTES
Problem: Falls - Risk of  Goal: *Absence of Falls  Description: Document Anila Marking Fall Risk and appropriate interventions in the flowsheet.   Outcome: Progressing Towards Goal  Note: Fall Risk Interventions:            Medication Interventions: Assess postural VS orthostatic hypotension,Bed/chair exit alarm,Teach patient to arise slowly,Patient to call before getting OOB,Evaluate medications/consider consulting pharmacy                   Problem: Patient Education: Go to Patient Education Activity  Goal: Patient/Family Education  Outcome: Progressing Towards Goal

## 2022-03-24 NOTE — CONSULTS
[address] [place] [address]    Name:  Jennell Schlatter  MR#:  [id number]  :  1955  ACCOUNT #:  [de-identified]  DATE OF SERVICE:  2022    ATTENDING PHYSICIAN:  Racheal Harmon. YOUSUF Herrera, hospitalist.    REASON FOR CONSULTATION:  Abnormal CT scan of the chest.    HISTORY OF PRESENT ILLNESS:  The patient is a 20-year-old [facility] American male. Information is obtained mostly from current medical records. Discussed with the nursing staff. He is a poor historian. However, he gives a history of tobacco abuse starting at the age of 15 years. The patient stated that he was a light smoker and is currently smoking three or four cigarettes a day. He has been on hemodialysis on Monday, Wednesday, and Friday for the last few years. He also has a pacemaker in and is not sure as to why. He believes that he has had minor heat attacks in the past.  He is not sure about his medications. He has been vaccinated. He did not get the flu vaccine. He has not had the corona virus during this pandemic. He had hemodialysis this morning; however, they did not take any fluid out. Apparently, there was no fluid that needed to be taken out according to the patient. He, however, did not feel well. He was noted to be hypertensive during dialysis. He felt fatigued. He presented to the emergency room. He was noted to have a blood pressure of 200/102. He claims to be compliant with his blood pressure medications, the names of which he does not recall. He denies taking any respiratory medications or inhalers. He is not on oxygen at home. Currently, he is on room air and not in any distress. He just feels tired. He does not make any urine output. He denies any fever or chills. Denies frequent sputum production. He had a chest x-ray done and a CT scan done which will be discussed below. Chest is negative for COVID-19 as well as influenza.     PAST MEDICAL HISTORY:  Significant for end-stage renal disease, on hemodialysis Monday, Wednesday, and Friday. History of pacemaker insertion, details are not clear. Probable coronary artery disease and dyslipidemia. ALLERGIES:  NO KNOWN DRUG ALLERGIES. MEDICATIONS:  Currently, the patient is started on:  1. Amlodipine 10 mg p.o. daily. 2.  Heparin 5000 units subcutaneously q.8 hourly. 3.  Hydralazine 100 mg p.o. t.i.d.  4.  Cozaar 50 mg p.o. daily. 5.  Metoprolol 50 mg p.o. daily. 6.  Zosyn 3.375 g IV q.12 hourly. 7.  Vancomycin IV per pharmacy protocol. SOCIAL HISTORY:  He lives with his brother who takes care of him. He has grown up children. History of extensive tobacco abuse starting at the age of 15 years, but he claims to have been always a light smoker. Denies drug or alcohol abuse. OCCUPATIONAL HISTORY:  He used to work for [employer]. FAMILY HISTORY:  Mostly noncontributory. High blood pressure runs in the family. REVIEW OF SYSTEMS:  Complete review of systems was undertaken. Pertinent findings are discussed above. All other systems were reviewed and are negative. PHYSICAL EXAMINATION:  GENERAL:  The patient is an elderly [facility] American male who is somewhat somnolent, but not in any distress. VITAL SIGNS:  T-max 105, now it is 100.2. Pulse is 71 per minute, respiratory rate is 12 per minute, and blood pressure is 167/92. His highest blood pressure was 200/102. He is on room air with a saturation of 98%. HEENT:  Shows pupils are equal and reactive to light. No pallor or icterus. Nasal passages are patent. Oropharynx is without thrush. NECK:  Supple. Trachea is central.  No JVD or lymphadenopathy. LUNGS:  He is not using any accessory muscles of respiration. CHEST:  Symmetrical with equal and fair air entry bilaterally. I cannot appreciate any significant rhonchi or wheezing. No significant crackles. No chest wall tenderness. HEART:  Rhythm is regular without any loud murmur or gallop.   ABDOMEN: Soft and benign. Bowel sounds are audible. No masses or organomegaly. EXTREMITIES:  Do not show any cyanosis or clubbing. No pitting edema. Pulses are palpable. He has a fistula in his left upper arm for hemodialysis. NEUROLOGIC: Grossly intact. SKIN:  Warm and dry. DIAGNOSTIC AND LABORATORY DATA:  Portable chest x-ray done today shows elevated right hemidiaphragm. There is pulmonary vascular congestion noted. He is status post pacemaker. CT of the chest without IV contrast was personally reviewed. There are mild patchy and scattered ground-glass opacities. A few of these opacities appear to be nodular. No pleural effusions. WBC count is 7.0 with neutrophils of 81%, hemoglobin is 11.8, platelet count of 947. Electrolytes are within normal limits. BUN is 13, creatinine is 4.74, blood glucose of 80. BNP more than 35,000. Troponin is 58. Rapid COVID test was negative. Influenza A and B is negative. ASSESSMENT AND PLAN:  3  A 40-year-old [facility] American male with a history of tobacco abuse starting at the age of 15 years, although a light smoker. He is presenting with fatigue and low-grade fever. CT of the chest without IV contrast shows mild patchy and scattered ground-glass opacities. WBC count is not elevated. Findings on the CT scan may also be related to interstitial edema given elevated BNP. However, he appears to have atypical pneumonia. He is started on IV Zosyn empirically along with vancomycin. However, I will add doxycycline for atypical coverage. I will check serologies for atypical infection. Blood cultures have been ordered and are pending. He does not have a productive cough at this time. We will continue to monitor closely. He is currently on room air. He was encouraged to quit tobacco abuse and he understands. He will need outpatient PFTs. 2.  Uncontrolled blood pressure. On presentation, his blood pressure was 200/102.   He has been started on his apparently usual home medications although details about his home medications are not clear. 3.  History of pacemaker and also coronary artery disease. Details are not clear again. Echocardiogram will be beneficial.  4.  For deep venous thrombosis prophylaxis, he is started on heparin subcutaneously. Thank you for allowing me to participate in the care of this patient. I will follow the patient closely with you. He also has end-stage renal disease, on hemodialysis Monday, Wednesday, and Friday. Nephrology is consulted.       Yas Mendez MD      [id number]  D:  03/23/2022 15:54  T:  03/23/2022 20:53  JOB #:  [id number]

## 2022-03-24 NOTE — CONSULTS
Nephrology Consult    Patient: Matilda Bridges MRN: [id number]  SSN: [id number]    YOB: 1955  Age: 79 y.o. Sex: male      Subjective:      Reason for the consultation. End-stage renal disease  History of present illness. The patient is a 61-year-old man with underlying history of end-stage renal disease, on hemodialysis Monday Wednesday Friday at [address] renal care in [place], left arm AV fistula, hypertension was sent from the dialysis center after he was not feeling well post dialysis treatment, on arrival his blood pressure was 200/102, chest x-ray with no/pulmonary edema, CT chest plain showed groundglass densities and pulmonology consultation has been placed. He was dialyzed full treatment yesterday. No noticed lower extremity swelling. No shortness of breath. He is on room air. No abdominal pain nausea vomiting or loose stools. No past medical history on file. No past surgical history on file. No family history on file.   Social History     Tobacco Use    Smoking status: Not on file    Smokeless tobacco: Not on file   Substance Use Topics    Alcohol use: Not on file      Current Facility-Administered Medications   Medication Dose Route Frequency Provider Last Rate Last Admin    sodium chloride (NS) flush 5-40 mL  5-40 mL IntraVENous Q8H Jaylyn Herrera PA-C   10 mL at 03/24/22 0527    sodium chloride (NS) flush 5-40 mL  5-40 mL IntraVENous PRN Jaylyn Herrera PA-C        acetaminophen (TYLENOL) tablet 650 mg  650 mg Oral Q6H PRN Anila Herrera PA-C   650 mg at 03/24/22 2349    Or    acetaminophen (TYLENOL) suppository 650 mg  650 mg Rectal Q6H PRN Jaylyn Herrera PA-C        polyethylene glycol (MIRALAX) packet 17 g  17 g Oral DAILY PRN Jaylyn Herrera PA-C        ondansetron (ZOFRAN ODT) tablet 4 mg  4 mg Oral Q8H PRN Jaylyn Herrera PA-C        Or    ondansetron (ZOFRAN) injection 4 mg  4 mg IntraVENous Q6H PRN Jaylyn Herrera PA-C        heparin (porcine) injection 5,000 Units  5,000 Units SubCUTAneous Q8H Jaylyn Herrera PA-C   5,000 Units at 03/24/22 2354    amLODIPine (NORVASC) tablet 10 mg  10 mg Oral DAILY Jaylyn Herrera PA-C   10 mg at 03/24/22 1051    hydrALAZINE (APRESOLINE) tablet 100 mg  100 mg Oral TID Madina Kael PA-C   100 mg at 03/24/22 1051    losartan (COZAAR) tablet 50 mg  50 mg Oral DAILY Jaylyn Herrera PA-C   50 mg at 03/24/22 1051    metoprolol succinate (TOPROL-XL) XL tablet 50 mg  50 mg Oral DAILY Jaylyn Herrera PA-C   50 mg at 03/24/22 1051    cloNIDine HCL (CATAPRES) tablet 0.1 mg  0.1 mg Oral Q8H PRN Jaylyn Herrera PA-C   0.1 mg at 03/24/22 0238    piperacillin-tazobactam (ZOSYN) 3.375 g in 0.9% sodium chloride (MBP/ADV) 100 mL MBP  3.375 g IntraVENous Q12H Jaylyn Herrera PA-C 25 mL/hr at 03/24/22 0235 3.375 g at 03/24/22 0235    doxycycline (VIBRAMYCIN) 100 mg in 0.9% sodium chloride (MBP/ADV) 100 mL MBP  100 mg IntraVENous Q12H Valery Carrillo  mL/hr at 03/24/22 0412 100 mg at 03/24/22 0412        No Known Allergies    Review of Systems:  A comprehensive review of systems was negative except for that written in the History of Present Illness. Objective:     Vitals:    03/23/22 2341 03/24/22 0234 03/24/22 0743 03/24/22 0751   BP: (!) 179/87 (!) 192/102  (!) 164/87   Pulse: 84 77  63   Resp: 18 16  17   Temp: 99.9 °F (37.7 °C) 99.3 °F (37.4 °C)  98.3 °F (36.8 °C)   SpO2: 99% 98% 97% 98%   Weight:       Height:            Physical Exam:  General: NAD  Eyes: sclera anicteric  Oral Cavity: No thrush or ulcers  Neck: no JVD  Chest: Fair bilateral air entry  Heart: normal sounds  Abdomen: soft and non tender   : no schmidt  Lower Extremities: no edema  Skin: no rash  Neuro: intact  Psychiatric: non-depressed            Assessment:     Hospital Problems  Never Reviewed          Codes Class Noted POA    Fever ICD-10-CM: R50.9  ICD-9-CM: 780.60  3/23/2022 Unknown              Plan:     1 ESRD.     -On hemodialysis Monday Wednesday Friday at [address] renal care in [place].    -He was dialyzed full treatment yesterday.    -Clinically no significant volume overload. No evidence of uremia.    -No indication for the dialysis today.    -Plan to dialyze him tomorrow as per his schedule.    -He has left upper arm AV fistula is a dialysis access. 2.  Hypertension.    -He was severely hypertensive on arrival with blood pressure 200/102.    -Now blood pressure is better controlled.    -He is currently on hydralazine/losartan/metoprolol and amlodipine which I will continue. 3.  Anemia.    -Hemoglobin is 10.9. No indication for Procrit for now. 4.  Fever.    -On arrival in the ER temp was 100.5. No leukocytosis. -CT chest showed groundglass opacity. Put on IV Zosyn.    -Pulmonology is on board. 5.  DVT prophylaxis. Agreed with unfractionated subcu heparin. Thank you for the consultation.     Signed By: Shereen Medina MD     March 24, 2022

## 2022-03-24 NOTE — PROGRESS NOTES
Hospitalist Progress Note         Mickey Aguilar MD          Daily Progress Note: 3/24/2022      Subjective: The patient is seen for follow  up. 79 y.o. male with a history of end-stage renal disease on hemodialysis Monday, Wednesday, Friday and hypertension that presented to the emergency room on 3/23/2022 for feeling fatigued. He had a full dialysis session today but they did not take any fluid off. Afterward he stated that he just did not feel right and presented to the ED for further evaluation. He admits to feeling fatigued, having no energy. Denies any focal weakness, numbness, tingling, increased shortness of breath, cough, fever, chills, nausea, vomiting. Overall patient is a poor historian. In the ED patient was found to be hypertensive with a low-grade fever. WBC within normal limits. Lactic acid 1.5. BNP greater than 35,000. Influenza negative rapid Covid negative. CT of the chest showed nonspecific groundglass sites range nodular-like to geographic scattered through the lungs. Oxygen saturation within normal limits on room air. Patient seen in follow-up. Notes improvement in breathing overnight. Quit smoking over 5 years ago.   No history of recent Covid infection    Problem List:  Problem List as of 3/24/2022 Never Reviewed          Codes Class Noted - Resolved    Fever ICD-10-CM: R50.9  ICD-9-CM: 780.60  3/23/2022 - Present              Medications reviewed  Current Facility-Administered Medications   Medication Dose Route Frequency    sodium chloride (NS) flush 5-40 mL  5-40 mL IntraVENous Q8H    sodium chloride (NS) flush 5-40 mL  5-40 mL IntraVENous PRN    acetaminophen (TYLENOL) tablet 650 mg  650 mg Oral Q6H PRN    Or    acetaminophen (TYLENOL) suppository 650 mg  650 mg Rectal Q6H PRN    polyethylene glycol (MIRALAX) packet 17 g  17 g Oral DAILY PRN    ondansetron (ZOFRAN ODT) tablet 4 mg  4 mg Oral Q8H PRN    Or    ondansetron (ZOFRAN) injection 4 mg  4 mg IntraVENous Q6H PRN    heparin (porcine) injection 5,000 Units  5,000 Units SubCUTAneous Q8H    amLODIPine (NORVASC) tablet 10 mg  10 mg Oral DAILY    hydrALAZINE (APRESOLINE) tablet 100 mg  100 mg Oral TID    losartan (COZAAR) tablet 50 mg  50 mg Oral DAILY    metoprolol succinate (TOPROL-XL) XL tablet 50 mg  50 mg Oral DAILY    cloNIDine HCL (CATAPRES) tablet 0.1 mg  0.1 mg Oral Q8H PRN    piperacillin-tazobactam (ZOSYN) 3.375 g in 0.9% sodium chloride (MBP/ADV) 100 mL MBP  3.375 g IntraVENous Q12H    doxycycline (VIBRAMYCIN) 100 mg in 0.9% sodium chloride (MBP/ADV) 100 mL MBP  100 mg IntraVENous Q12H       Review of Systems:   A comprehensive review of systems was negative except for that written in the HPI. Objective:   Physical Exam:     Visit Vitals  BP (!) 164/87 (BP 1 Location: Right upper arm, BP Patient Position: At rest)   Pulse 63   Temp 98.3 °F (36.8 °C)   Resp 17   Ht 6' 1\" (1.854 m)   Wt 74 kg (163 lb 2.3 oz)   SpO2 98%   BMI 21.52 kg/m²    O2 Flow Rate (L/min): 1 l/min O2 Device: None (Room air)    Temp (24hrs), Av.2 °F (37.3 °C), Min:98.3 °F (36.8 °C), Max:99.9 °F (37.7 °C)    No intake/output data recorded.  1901 -  0700  In: 100 [I.V.:100]  Out: -     General:  Alert, cooperative, no distress, appears stated age. Lungs:   Clear to auscultation bilaterally. Chest wall:  No tenderness or deformity. Heart:  Regular rate and rhythm, S1, S2 normal, no murmur, click, rub or gallop. Abdomen:   Soft, non-tender. Bowel sounds normal. No masses,  No organomegaly. Extremities: Extremities normal, atraumatic, no cyanosis or edema. Pulses: 2+ and symmetric all extremities. Skin: Skin color, texture, turgor normal. No rashes or lesions   Neurologic: CNII-XII intact.  No gross sensory or motor deficits     Data Review:       Recent Days:  Recent Labs     22  0658 22  1136   WBC 5.9 7.0   HGB 10.9* 11.8*   HCT 33.7* 36.3*   * 132* Recent Labs     03/24/22  0658 03/23/22  1136    133*   K 3.7 3.9    98   CO2 30 30   GLU 87 80   BUN 22* 13   CREA 6.86* 4.74*   CA 8.7 8.9   ALB  --  3.3*   TBILI  --  0.6   ALT  --  15     No results for input(s): PH, PCO2, PO2, HCO3, FIO2 in the last 72 hours. 24 Hour Results:  Recent Results (from the past 24 hour(s))   TROPONIN-HIGH SENSITIVITY    Collection Time: 03/23/22  5:30 PM   Result Value Ref Range    Troponin-High Sensitivity 68 0 - 76 ng/L   TROPONIN-HIGH SENSITIVITY    Collection Time: 03/23/22 10:23 PM   Result Value Ref Range    Troponin-High Sensitivity 61 0 - 76 ng/L   METABOLIC PANEL, BASIC    Collection Time: 03/24/22  6:58 AM   Result Value Ref Range    Sodium 137 136 - 145 mmol/L    Potassium 3.7 3.5 - 5.1 mmol/L    Chloride 103 97 - 108 mmol/L    CO2 30 21 - 32 mmol/L    Anion gap 4 (L) 5 - 15 mmol/L    Glucose 87 65 - 100 mg/dL    BUN 22 (H) 6 - 20 mg/dL    Creatinine 6.86 (H) 0.70 - 1.30 mg/dL    BUN/Creatinine ratio 3 (L) 12 - 20      GFR est AA 10 (L) >60 ml/min/1.73m2    GFR est non-AA 8 (L) >60 ml/min/1.73m2    Calcium 8.7 8.5 - 10.1 mg/dL   CBC WITH AUTOMATED DIFF    Collection Time: 03/24/22  6:58 AM   Result Value Ref Range    WBC 5.9 4.1 - 11.1 K/uL    RBC 3.49 (L) 4.10 - 5.70 M/uL    HGB 10.9 (L) 12.1 - 17.0 g/dL    HCT 33.7 (L) 36.6 - 50.3 %    MCV 96.6 80.0 - 99.0 FL    MCH 31.2 26.0 - 34.0 PG    MCHC 32.3 30.0 - 36.5 g/dL    RDW 13.5 11.5 - 14.5 %    PLATELET 524 (L) 198 - 400 K/uL    MPV 10.4 8.9 - 12.9 FL    NRBC 0.0 0.0  WBC    ABSOLUTE NRBC 0.00 0.00 - 0.01 K/uL    NEUTROPHILS 75 32 - 75 %    LYMPHOCYTES 12 12 - 49 %    MONOCYTES 11 5 - 13 %    EOSINOPHILS 2 0 - 7 %    BASOPHILS 0 0 - 1 %    IMMATURE GRANULOCYTES 0 0 - 0.5 %    ABS. NEUTROPHILS 4.4 1.8 - 8.0 K/UL    ABS. LYMPHOCYTES 0.7 (L) 0.8 - 3.5 K/UL    ABS. MONOCYTES 0.6 0.0 - 1.0 K/UL    ABS. EOSINOPHILS 0.1 0.0 - 0.4 K/UL    ABS. BASOPHILS 0.0 0.0 - 0.1 K/UL    ABS. IMM.  GRANS. 0.0 0.00 - 0.04 K/UL    DF AUTOMATED     C REACTIVE PROTEIN, QT    Collection Time: 03/24/22  6:58 AM   Result Value Ref Range    C-Reactive protein 3.66 (H) 0.00 - 0.60 mg/dL   ECHO ADULT COMPLETE    Collection Time: 03/24/22  9:19 AM   Result Value Ref Range    AR .0 ms    AR Max Velocity PISA 4.7 m/s    AV Peak Velocity 2.0 m/s    AV Peak Gradient 16 mmHg    Aortic Root 3.5 cm    IVSd 1.9 (A) 0.6 - 1.0 cm    LVIDd 5.8 4.2 - 5.9 cm    LVIDs 4.1 cm    LVOT Peak Velocity 1.4 m/s    LVOT Peak Gradient 8 mmHg    LVPWd 1.4 (A) 0.6 - 1.0 cm    LV E' Lateral Velocity 5 cm/s    LV E' Septal Velocity 6 cm/s    LA Diameter 4.9 cm    MR Peak Velocity 5.9 m/s    MR Peak Gradient 139 mmHg    MV E Wave Deceleration Time 229.0 ms    MV A Velocity 1.28 m/s    MV E Velocity 1.18 m/s    PV Max Velocity 1.2 m/s    PV Peak Gradient 5 mmHg    Est. RA Pressure 8 mmHg    RVIDd 3.0 cm    TR Max Velocity 3.11 m/s    TR Peak Gradient 39 mmHg    Fractional Shortening 2D 29 28 - 44 %    LVIDd Index 2.94 cm/m2    LVIDs Index 2.08 cm/m2    LV RWT Ratio 0.48     LV Mass 2D 465.2 (A) 88 - 224 g    LV Mass 2D Index 236.2 (A) 49 - 115 g/m2    MV E/A 0.92     E/E' Ratio (Averaged) 21.63     E/E' Lateral 23.60     E/E' Septal 19.67     LA Size Index 2.49 cm/m2    LA/AO Root Ratio 1.40     Ao Root Index 1.78 cm/m2    AV Velocity Ratio 0.70     RVSP 47 mmHg    EF BP 56 55 - 100 %           Assessment/     Probable pneumonia  Benign essential hypertension. Labile  End-stage renal disease on hemodialysis Monday Wednesday Fridays        Plan:  Continue supportive care including IV antibiotic  Await pulmonology's input  Dialysis in a.m. Care Plan discussed with: Patient/Family    Total time spent with patient: 30 minutes.     Cali Hou MD

## 2022-03-24 NOTE — PROGRESS NOTES
Pulmonary Progress Note      NAME: Katelynn Pac   :  1955  MRM:  [id number]    Date/Time: 3/24/2022  5:02 PM         Subjective:     Patient seen and examined. He is awake and alert. He is laying comfortably in bed and has been on room air. He feels much better. He denies significant cough or sputum production. He will get hemodialysis tomorrow. He is asking about discharge plans. Past Medical History reviewed and unchanged from Admission History and Physical       Objective:     Physical Exam     Vitals:      Last 24hrs VS reviewed since prior progress note. Most recent are:    Visit Vitals  BP (!) 164/87 (BP 1 Location: Right upper arm, BP Patient Position: At rest)   Pulse 63   Temp 98.3 °F (36.8 °C)   Resp 17   Ht 6' 1\" (1.854 m)   Wt 74 kg (163 lb 2.3 oz)   SpO2 98%   BMI 21.52 kg/m²     SpO2 Readings from Last 6 Encounters:   22 98%    O2 Flow Rate (L/min): 1 l/min       Intake/Output Summary (Last 24 hours) at 3/24/2022 1702  Last data filed at 3/24/2022 0234  Gross per 24 hour   Intake 100 ml   Output --   Net 100 ml      GENERAL:  The patient is an elderly  male who is somewhat somnolent, but not in any distress. He is on room air  HEENT:  Shows pupils are equal and reactive to light. No pallor or icterus. Nasal passages are patent. Oropharynx is without thrush. NECK:  Supple. Trachea is central.  No JVD or lymphadenopathy. LUNGS:  He is not using any accessory muscles of respiration. CHEST:  Symmetrical with equal and fair air entry bilaterally. I cannot appreciate any significant rhonchi or wheezing. No significant crackles. No chest wall tenderness. HEART:  Rhythm is regular without any loud murmur or gallop. ABDOMEN:  Soft and benign. Bowel sounds are audible. No masses or organomegaly. EXTREMITIES:  Do not show any cyanosis or clubbing. No pitting edema. Pulses are palpable.   He has a fistula in his left upper arm for hemodialysis. NEUROLOGIC: Grossly intact. SKIN:  Warm and dry. CT CHEST WO CONT   Final Result   Nonspecific groundglass sites range nodular-like to geographic   scattered through lungs. Differential considerations would include   infectious/inflammatory process including viral etiologies. Neoplastic process   not excluded at this point. Consider follow-up imaging to resolution. No pleural or pericardial effusion. Thoracoabdominal aorta atherosclerosis. CAD. Possible aortic valvular stenosis. Elevation right hemidiaphragm. Cholelithiasis.       XR CHEST PORT   Final Result          Lab Data Reviewed: (see below)      Medications:  Current Facility-Administered Medications   Medication Dose Route Frequency    sodium chloride (NS) flush 5-40 mL  5-40 mL IntraVENous Q8H    sodium chloride (NS) flush 5-40 mL  5-40 mL IntraVENous PRN    acetaminophen (TYLENOL) tablet 650 mg  650 mg Oral Q6H PRN    Or    acetaminophen (TYLENOL) suppository 650 mg  650 mg Rectal Q6H PRN    polyethylene glycol (MIRALAX) packet 17 g  17 g Oral DAILY PRN    ondansetron (ZOFRAN ODT) tablet 4 mg  4 mg Oral Q8H PRN    Or    ondansetron (ZOFRAN) injection 4 mg  4 mg IntraVENous Q6H PRN    heparin (porcine) injection 5,000 Units  5,000 Units SubCUTAneous Q8H    amLODIPine (NORVASC) tablet 10 mg  10 mg Oral DAILY    hydrALAZINE (APRESOLINE) tablet 100 mg  100 mg Oral TID    losartan (COZAAR) tablet 50 mg  50 mg Oral DAILY    metoprolol succinate (TOPROL-XL) XL tablet 50 mg  50 mg Oral DAILY    cloNIDine HCL (CATAPRES) tablet 0.1 mg  0.1 mg Oral Q8H PRN    piperacillin-tazobactam (ZOSYN) 3.375 g in 0.9% sodium chloride (MBP/ADV) 100 mL MBP  3.375 g IntraVENous Q12H    doxycycline (VIBRAMYCIN) 100 mg in 0.9% sodium chloride (MBP/ADV) 100 mL MBP  100 mg IntraVENous Q12H       ______________________________________________________________________      Lab Review:     Recent Labs     03/24/22  0658 03/23/22  1136   WBC 5.9 7.0   HGB 10.9* 11.8*   HCT 33.7* 36.3*   * 132*     Recent Labs     03/24/22  0658 03/23/22  1136    133*   K 3.7 3.9    98   CO2 30 30   GLU 87 80   BUN 22* 13   CREA 6.86* 4.74*   CA 8.7 8.9   ALB  --  3.3*   ALT  --  15     No components found for: GLPOC  No results for input(s): PH, PCO2, PO2, HCO3, FIO2 in the last 72 hours. No results for input(s): INR, INREXT, INREXT in the last 72 hours. Other pertinent lab:   Labs on admission: Portable chest x-ray done 3/23 shows elevated right hemidiaphragm. There is pulmonary vascular congestion noted. He is status post pacemaker. CT of the chest without IV contrast was personally reviewed. There are mild patchy and scattered ground-glass opacities. A few of these opacities appear to be nodular. No pleural effusions.     BNP more than 35,000. Troponin is 58. Rapid COVID test was negative. Influenza A and B is negative. Assessment & Plan:      1. A 26-year-old [facility] American male with a history of tobacco abuse starting at the age of 15 years, although a light smoker. He is presenting with fatigue and low-grade fever. CT of the chest without IV contrast shows mild patchy and scattered ground-glass opacities. WBC count is not elevated. Findings on the CT scan may also be related to interstitial edema given elevated BNP. However, we will give the benefit of doubt and treat him as atypical pneumonia. He is started on IV Zosyn empirically along with vancomycin. However, I added doxycycline for atypical coverage. Mycoplasma IgM is positive. Doxycycline should cover it. Blood cultures have been ordered and are pending. He does not have a productive cough at this time. We will continue to monitor closely. He is currently on room air. He was encouraged to quit tobacco abuse and he understands. He will need outpatient PFTs. 2.  Uncontrolled blood pressure. On presentation, his blood pressure was 200/102. He has been started on his apparently usual home medications although details about his home medications are not clear. 3.  History of pacemaker and also coronary artery disease. Details are not clear again. Echocardiogram was obtained today showing an EF of 60 to 65% but with a concentric left hypertrophy, moderate to severe mitral regurg and there dilated left atrium. 4.   End-stage renal disease on hemodialysis. Nephrology is consulted. He will undergo hemodialysis tomorrow. His days are Monday Wednesday and Fridays. 5.  For deep venous thrombosis prophylaxis, he is started on heparin subcutaneously.     Thank you for allowing me to participate in the care of this patient. I will follow the patient closely with you. From pulmonary standpoint the patient can be discharged home tomorrow on oral Augmentin and doxycycline to finish 1 week.         Jimmy Castillo MD

## 2022-03-24 NOTE — PROGRESS NOTES
PT eval order received and acknowledged. Pt screened and is currently presenting at baseline I for functional mobility/transfers. Pt reports he has been amb to and from the bathroom without assistance and no difficulty noted. Pt refused OOB mobility with PT this session, stating he has no needs at this time. PT evaluation order will be discontinued at this time as pt has no acute PT needs. Please reorder PT if pt functional status changes. Thank you.

## 2022-03-24 NOTE — PROGRESS NOTES
Problem: Falls - Risk of  Goal: *Absence of Falls  Description: Document [place] Fall Risk and appropriate interventions in the flowsheet. Outcome: Progressing Towards Goal  Note: Fall Risk Interventions:   Bed is in the lowest position and wheels are locked, call bell is within reach, bathroom light is on during evening hours, gripper socks are on and patient has been instructed to call out for assistance if needed. As of now, patient is free from falls and will continue to be monitored. Bedside shift change report given to Venita Cerda RN (oncoming nurse) by Alan Pederson RN (offgoing nurse). Report included the following information SBAR, Kardex, Intake/Output, MAR, Accordion, Recent Results, Med Rec Status and Cardiac Rhythm V-Paced.

## 2022-03-25 NOTE — DISCHARGE SUMMARY
Physician Discharge Summary     Patient ID:    Aiyana Michel  [id number]  89 y.o.  1955    Admit date: 3/23/2022    Discharge date : 3/25/2022    Chronic Diagnoses:    Problem List as of 3/25/2022 Never Reviewed          Codes Class Noted - Resolved    Fever ICD-10-CM: R50.9  ICD-9-CM: 780.60  3/23/2022 - Present          22    Final Diagnoses:   Fever [R50.9]  Community acquired pneumonia  Benign essential hypertension. Labile  End-stage renal disease on hemodialysis Monday Wednesday Fridays     Reason for Hospitalization:  Fever and cough      Hospital Course:    79 y. o. male with a history of end-stage renal disease on hemodialysis Monday, Wednesday, Friday and hypertension that presented to the emergency room on 3/23/2022 for feeling fatigued.  He had a full dialysis session today but they did not take any fluid off.  Afterward he stated that he just did not feel right and presented to the ED for further evaluation. Nicholas Hernandez admits to feeling fatigued, having no energy.  Denies any focal weakness, numbness, tingling, increased shortness of breath, cough, fever, chills, nausea, vomiting.  Overall patient is a poor historian. Usha Hillman the ED patient was found to be hypertensive with a low-grade fever.  WBC within normal limits.  Lactic acid 1.5.  BNP greater than 35,000. Yvonnie Slayton negative rapid Covid negative.  CT of the chest showed nonspecific groundglass sites range nodular-like to geographic scattered through the lungs. He was treated with IV antibiotics with improvement. Will need close follow-up with pulmonology as outpatient. Stable for discharge to home              Discharge Medications:   Current Discharge Medication List      START taking these medications    Details   amLODIPine (NORVASC) 10 mg tablet Take 1 Tablet by mouth daily for 30 days.   Qty: 30 Tablet, Refills: 0  Start date: 3/25/2022, End date: 4/24/2022      losartan (COZAAR) 50 mg tablet Take 1 Tablet by mouth daily for 30 days. Qty: 30 Tablet, Refills: 0  Start date: 3/25/2022, End date: 4/24/2022      amoxicillin-clavulanate (Augmentin) 500-125 mg per tablet Take 1 Tablet by mouth every twelve (12) hours for 7 days. Qty: 14 Tablet, Refills: 0  Start date: 3/25/2022, End date: 4/1/2022      doxycycline (ADOXA) 100 mg tablet Take 1 Tablet by mouth two (2) times a day for 7 days. Qty: 14 Tablet, Refills: 0  Start date: 3/25/2022, End date: 4/1/2022         CONTINUE these medications which have CHANGED    Details   hydrALAZINE (APRESOLINE) 100 mg tablet Take 1 Tablet by mouth three (3) times daily for 30 days. Qty: 90 Tablet, Refills: 0  Start date: 3/25/2022, End date: 4/24/2022         CONTINUE these medications which have NOT CHANGED    Details   acetaminophen (TYLENOL) 325 mg tablet Take 650 mg by mouth three (3) times daily. aspirin delayed-release 81 mg tablet Take 81 mg by mouth daily. carvediloL (COREG) 25 mg tablet Take 25 mg by mouth two (2) times a day. cholecalciferol, vitamin D3, (Vitamin D3) 50 mcg (2,000 unit) tab Take 1 Tablet by mouth daily. tamsulosin (FLOMAX) 0.4 mg capsule Take 0.4 mg by mouth daily. isosorbide mononitrate ER (IMDUR) 60 mg CR tablet Take 30 mg by mouth every morning. levothyroxine (SYNTHROID) 75 mcg tablet Take 75 mcg by mouth Daily (before breakfast). b complex-vitamin c-folic acid (NEPHROCAPS) 1 mg capsule Take 1 Capsule by mouth daily. sevelamer carbonate (RENVELA) 800 mg tab tab Take 1,600 mg by mouth three (3) times daily (with meals). cloNIDine HCL (CATAPRES) 0.1 mg tablet Take 0.1 mg by mouth DIALYSIS PRN. !! heparin sodium,porcine (heparin, porcine,) 1,000 unit/mL injection 2,750 Units by IntraVENous route DIALYSIS ONCE. Maintenance dose during dialysis. !! heparin sodium,porcine (heparin, porcine,) 1,000 unit/mL injection 1,000 Units by IntraVENous route DIALYSIS ONCE. Initial dose during dialysis.       amino acids-protein hydrolys (LiquaceL)  gram-kcal/30 mL liqd Take 60 mL by mouth DIALYSIS ONCE. Administered during dialysis. cinacalcet (Sensipar) 30 mg tablet Take 120 mg by mouth DIALYSIS ONCE. Administered during dialysis. iron sucrose (Venofer) 50 mg iron/2.5 mL soln 200 mg by IntraVENous route DIALYSIS ONCE. !! - Potential duplicate medications found. Please discuss with provider. Follow up Care:    1. None in 1-2 weeks. Please call to set up an appointment shortly after discharge. Diet:  Cardiac Diet    Disposition:  Home. Advanced Directive:   FULL    DNR      Discharge Exam:  Visit Vitals  BP (!) 158/84 (BP 1 Location: Right upper arm)   Pulse 69   Temp 98.8 °F (37.1 °C)   Resp 20   Ht 6' 1\" (1.854 m)   Wt 74 kg (163 lb 2.3 oz)   SpO2 96%   BMI 21.52 kg/m²    O2 Flow Rate (L/min): 1 l/min O2 Device: None (Room air)    Temp (24hrs), Av °F (37.2 °C), Min:98.8 °F (37.1 °C), Max:99.4 °F (37.4 °C)    No intake/output data recorded.  1901 -  0700  In: 100 [I.V.:100]  Out: -     General:  Alert, cooperative, no distress, appears stated age. Lungs:   Clear to auscultation bilaterally. Chest wall:  No tenderness or deformity. Heart:  Regular rate and rhythm, S1, S2 normal, no murmur, click, rub or gallop. Abdomen:   Soft, non-tender. Bowel sounds normal. No masses,  No organomegaly. Extremities: Extremities normal, atraumatic, no cyanosis or edema. Pulses: 2+ and symmetric all extremities. Skin: Skin color, texture, turgor normal. No rashes or lesions   Neurologic: CNII-XII intact. No gross sensory or motor deficits         CONSULTATIONS: Pulmonary/Intensive care    Significant Diagnostic Studies:   3/23/2022: BUN 13 mg/dL (Ref range: 6 - 20 mg/dL); Calcium 8.9 mg/dL (Ref range: 8.5 - 10.1 mg/dL); CO2 30 mmol/L (Ref range: 21 - 32 mmol/L); Creatinine 4.74 mg/dL (H; Ref range: 0.70 - 1.30 mg/dL);  Glucose 80 mg/dL (Ref range: 65 - 100 mg/dL); HCT 36.3 % (L; Ref range: 36.6 - 50.3 %); HGB 11.8 g/dL (L; Ref range: 12.1 - 17.0 g/dL); Potassium 3.9 mmol/L (Ref range: 3.5 - 5.1 mmol/L); Sodium 133 mmol/L (L; Ref range: 136 - 145 mmol/L)  3/24/2022: BUN 22 mg/dL (H; Ref range: 6 - 20 mg/dL); Calcium 8.7 mg/dL (Ref range: 8.5 - 10.1 mg/dL); CO2 30 mmol/L (Ref range: 21 - 32 mmol/L); Creatinine 6.86 mg/dL (H; Ref range: 0.70 - 1.30 mg/dL); Glucose 87 mg/dL (Ref range: 65 - 100 mg/dL); HCT 33.7 % (L; Ref range: 36.6 - 50.3 %); HGB 10.9 g/dL (L; Ref range: 12.1 - 17.0 g/dL); Potassium 3.7 mmol/L (Ref range: 3.5 - 5.1 mmol/L); Sodium 137 mmol/L (Ref range: 136 - 145 mmol/L)  Recent Labs     03/24/22  0658 03/23/22  1136   WBC 5.9 7.0   HGB 10.9* 11.8*   HCT 33.7* 36.3*   * 132*     Recent Labs     03/25/22  0845 03/24/22  0658 03/23/22  1136    137 133*   K 3.8 3.7 3.9    103 98   CO2 25 30 30   BUN 32* 22* 13   CREA 9.15* 6.86* 4.74*   * 87 80   CA 9.0 8.7 8.9   PHOS 4.1  --   --      Recent Labs     03/25/22  0845 03/23/22  1136   ALT  --  15   AP  --  77   TBILI  --  0.6   TP  --  7.5   ALB 2.6* 3.3*   GLOB  --  4.2*     No results for input(s): INR, PTP, APTT, INREXT in the last 72 hours. No results for input(s): FE, TIBC, PSAT, FERR in the last 72 hours. No results for input(s): PH, PCO2, PO2 in the last 72 hours. No results for input(s): CPK, CKMB in the last 72 hours.     No lab exists for component: TROPONINI  No results found for: GLUCPOC    Total Time: 35 minutes    Signed:  Cali Hou MD  3/25/2022  11:46 AM

## 2022-03-25 NOTE — PROGRESS NOTES
Pulmonary Progress Note      NAME: Heather Ochoa   :  1955  MRM:  [id number]    Date/Time: 3/25/2022  5:02 PM         Subjective:     Patient seen and examined. He is awake and alert. He is laying comfortably in bed and has been on room air. He had hemodialysis done earlier today and 2 L were removed. He is for discharge today. Feels much better. He denies significant cough or sputum production. Past Medical History reviewed and unchanged from Admission History and Physical       Objective:     Physical Exam     Vitals:      Last 24hrs VS reviewed since prior progress note. Most recent are:    Visit Vitals  BP (!) 167/88   Pulse 66   Temp 98 °F (36.7 °C)   Resp 14   Ht 6' 1\" (1.854 m)   Wt 74 kg (163 lb 2.3 oz)   SpO2 96%   BMI 21.52 kg/m²     SpO2 Readings from Last 6 Encounters:   22 96%    O2 Flow Rate (L/min): 1 l/min       Intake/Output Summary (Last 24 hours) at 3/25/2022 1355  Last data filed at 3/25/2022 1200  Gross per 24 hour   Intake --   Output 2300 ml   Net -2300 ml      GENERAL:  The patient is an elderly  male who is somewhat somnolent, but not in any distress. He is on room air  HEENT:  Shows pupils are equal and reactive to light. No pallor or icterus. Nasal passages are patent. Oropharynx is without thrush. NECK:  Supple. Trachea is central.  No JVD or lymphadenopathy. LUNGS:  He is not using any accessory muscles of respiration. CHEST:  Symmetrical with equal and fair air entry bilaterally. I cannot appreciate any significant rhonchi or wheezing. No significant crackles. No chest wall tenderness. HEART:  Rhythm is regular without any loud murmur or gallop. ABDOMEN:  Soft and benign. Bowel sounds are audible. No masses or organomegaly. EXTREMITIES:  Do not show any cyanosis or clubbing. No pitting edema. Pulses are palpable. He has a fistula in his left upper arm for hemodialysis. NEUROLOGIC: Grossly intact.   SKIN:  Warm and dry.    CT CHEST WO CONT   Final Result   Nonspecific groundglass sites range nodular-like to geographic   scattered through lungs. Differential considerations would include   infectious/inflammatory process including viral etiologies. Neoplastic process   not excluded at this point. Consider follow-up imaging to resolution. No pleural or pericardial effusion. Thoracoabdominal aorta atherosclerosis. CAD. Possible aortic valvular stenosis. Elevation right hemidiaphragm. Cholelithiasis.       XR CHEST PORT   Final Result          Lab Data Reviewed: (see below)      Medications:  Current Facility-Administered Medications   Medication Dose Route Frequency    albuterol-ipratropium (DUO-NEB) 2.5 MG-0.5 MG/3 ML  3 mL Nebulization Q6H PRN    sodium chloride (NS) flush 5-40 mL  5-40 mL IntraVENous PRN    acetaminophen (TYLENOL) tablet 650 mg  650 mg Oral Q6H PRN    Or    acetaminophen (TYLENOL) suppository 650 mg  650 mg Rectal Q6H PRN    polyethylene glycol (MIRALAX) packet 17 g  17 g Oral DAILY PRN    ondansetron (ZOFRAN ODT) tablet 4 mg  4 mg Oral Q8H PRN    Or    ondansetron (ZOFRAN) injection 4 mg  4 mg IntraVENous Q6H PRN    heparin (porcine) injection 5,000 Units  5,000 Units SubCUTAneous Q8H    amLODIPine (NORVASC) tablet 10 mg  10 mg Oral DAILY    hydrALAZINE (APRESOLINE) tablet 100 mg  100 mg Oral TID    losartan (COZAAR) tablet 50 mg  50 mg Oral DAILY    metoprolol succinate (TOPROL-XL) XL tablet 50 mg  50 mg Oral DAILY    cloNIDine HCL (CATAPRES) tablet 0.1 mg  0.1 mg Oral Q8H PRN    piperacillin-tazobactam (ZOSYN) 3.375 g in 0.9% sodium chloride (MBP/ADV) 100 mL MBP  3.375 g IntraVENous Q12H    doxycycline (VIBRAMYCIN) 100 mg in 0.9% sodium chloride (MBP/ADV) 100 mL MBP  100 mg IntraVENous Q12H       ______________________________________________________________________      Lab Review:     Recent Labs     03/24/22  0658 03/23/22  1136   WBC 5.9 7.0   HGB 10.9* 11.8*   HCT 33.7* 36.3* * 132*     Recent Labs     03/25/22  0845 03/24/22  0658 03/23/22  1136    137 133*   K 3.8 3.7 3.9    103 98   CO2 25 30 30   * 87 80   BUN 32* 22* 13   CREA 9.15* 6.86* 4.74*   CA 9.0 8.7 8.9   PHOS 4.1  --   --    ALB 2.6*  --  3.3*   ALT  --   --  15     No components found for: GLPOC  No results for input(s): PH, PCO2, PO2, HCO3, FIO2 in the last 72 hours. No results for input(s): INR, INREXT, INREXT, INREXT in the last 72 hours. Other pertinent lab:   Labs on admission: Portable chest x-ray done 3/23 shows elevated right hemidiaphragm. There is pulmonary vascular congestion noted. He is status post pacemaker. CT of the chest without IV contrast was personally reviewed. There are mild patchy and scattered ground-glass opacities. A few of these opacities appear to be nodular. No pleural effusions.     BNP more than 35,000. Troponin is 58. Rapid COVID test was negative. Influenza A and B is negative. Assessment & Plan:      1. A 71-year-old [facility] American male with a history of tobacco abuse starting at the age of 15 years, although a light smoker. He is presenting with fatigue and low-grade fever. CT of the chest without IV contrast shows mild patchy and scattered ground-glass opacities. WBC count is not elevated. Findings on the CT scan may also be related to interstitial edema given elevated BNP. However, we will give the benefit of doubt and treat him as atypical pneumonia. He is started on IV Zosyn empirically along with vancomycin. However, I added doxycycline for atypical coverage. Mycoplasma IgM is positive. Doxycycline should cover it. Blood cultures have been ordered and are pending. He does not have a productive cough at this time. He is currently on room air. Agree with discharge home with oral doxycycline and Augmentin to finish 1 week. He was encouraged to quit tobacco abuse and he understands.   He will need outpatient PFTs.    2.  Uncontrolled blood pressure. On presentation, his blood pressure was 200/102. He has been started on his usual home medications. 3.  History of pacemaker and also coronary artery disease. Details are not clear again. Echocardiogram was obtained 3/24 showing an EF of 60 to 65% but with a concentric left hypertrophy, moderate to severe mitral regurg and there dilated left atrium. 4.   End-stage renal disease on hemodialysis. Nephrology is consulted. He underwent hemodialysis today. His days are Monday Wednesday and Fridays. 5.  For deep venous thrombosis prophylaxis, he is started on heparin subcutaneously.     Thank you for allowing me to participate in the care of this patient. I will be happy to follow him as outpatient.         Eliazar Mckeon MD

## 2022-03-25 NOTE — PROGRESS NOTES
Patient given discharge instructions. Follow up appointments discussed and medications reviewed. IV(s) removed. Telemetry removed and returned. Primary RN, case management, provider, and patient aware of discharge. Patient is ready for discharge and awaiting pickup at this time. Discharge plan of care/case management plan validated with provider discharge order.

## 2022-08-13 PROBLEM — I21.4 NSTEMI (NON-ST ELEVATED MYOCARDIAL INFARCTION) (HCC): Status: ACTIVE | Noted: 2022-01-01

## 2022-08-13 NOTE — ED PROVIDER NOTES
EMERGENCY DEPARTMENT HISTORY AND PHYSICAL EXAM      Date: 8/13/2022  Patient Name: Latrell Cardoso      History of Presenting Illness     Chief Complaint   Patient presents with    Chest Pain    Shortness of Breath    Arm Pain       History Provided By: Patient    HPI: Latrell Cardoso, 79 y.o. male with a past medical history significant  for ESRD on dialysis  presents to the ED with cc of left-sided chest pain. Patient reports the pain is moderate to severe, without noted aggravating relieving factors and is constant. Patient reports the pain is associated with left arm pain and swelling. Patient also reports associated shortness of breath. Patient states he is Monday Wednesday Friday dialysis and did not finish his dialysis yesterday. There are no other complaints, changes, or physical findings at this time. PCP: None    Current Outpatient Medications   Medication Sig Dispense Refill    acetaminophen (TYLENOL) 325 mg tablet Take 650 mg by mouth three (3) times daily. aspirin delayed-release 81 mg tablet Take 81 mg by mouth daily. carvediloL (COREG) 25 mg tablet Take 25 mg by mouth two (2) times a day. cholecalciferol, vitamin D3, (Vitamin D3) 50 mcg (2,000 unit) tab Take 1 Tablet by mouth daily. tamsulosin (FLOMAX) 0.4 mg capsule Take 0.4 mg by mouth daily. isosorbide mononitrate ER (IMDUR) 60 mg CR tablet Take 30 mg by mouth every morning. levothyroxine (SYNTHROID) 75 mcg tablet Take 75 mcg by mouth Daily (before breakfast). b complex-vitamin c-folic acid (NEPHROCAPS) 1 mg capsule Take 1 Capsule by mouth daily. sevelamer carbonate (RENVELA) 800 mg tab tab Take 1,600 mg by mouth three (3) times daily (with meals). cloNIDine HCL (CATAPRES) 0.1 mg tablet Take 0.1 mg by mouth DIALYSIS PRN. heparin sodium,porcine (heparin, porcine,) 1,000 unit/mL injection 2,750 Units by IntraVENous route DIALYSIS ONCE. Maintenance dose during dialysis.       heparin sodium,porcine (heparin, porcine,) 1,000 unit/mL injection 1,000 Units by IntraVENous route DIALYSIS ONCE. Initial dose during dialysis. amino acids-protein hydrolys (LiquaceL)  gram-kcal/30 mL liqd Take 60 mL by mouth DIALYSIS ONCE. Administered during dialysis. cinacalcet (Sensipar) 30 mg tablet Take 120 mg by mouth DIALYSIS ONCE. Administered during dialysis. iron sucrose (Venofer) 50 mg iron/2.5 mL soln 200 mg by IntraVENous route DIALYSIS ONCE. Past History   Past Medical History:  ESRD on dialysis    Past Surgical History:  Denies    Family History:  No family history on file. Social History:   Local resident, retired    Allergies:  No Known Allergies  Review of Systems   Review of Systems  Review of Systems   Constitutional: Negative for chills and fever. HENT: Negative for sinus pressure and sinus pain. Eyes: Negative for photophobia and redness. Respiratory: Positive for shortness of breath and negative for wheezing. Cardiovascular: Positive for chest pain and negative for palpitations. Gastrointestinal: Negative for abdominal pain and nausea. Genitourinary: Negative for flank pain and hematuria. Musculoskeletal: Negative for arthralgias and gait problem. Skin: Negative for color change and pallor. Neurological: Negative for dizziness and weakness. Physical Exam   Physical Exam  Physical Exam  Constitutional:       General: No acute distress. Appearance: Normal appearance. Not toxic-appearing. HENT:      Head: Normocephalic and atraumatic. Nose: Nose normal.      Mouth/Throat:      Mouth: Mucous membranes are moist.   Eyes:      Extraocular Movements: Extraocular movements intact. Pupils: Pupils are equal, round, and reactive to light. Cardiovascular:      Rate and Rhythm: Normal rate. Pulses: Normal pulses.    Pulmonary:      Effort: Pulmonary effort is normal.      Breath sounds: No stridor, clear to auscultation bilaterally  Abdominal:      General: Abdomen is flat. There is no distension. Musculoskeletal:         General: Normal range of motion. Cervical back: Normal range of motion and neck supple. Skin:     General: Skin is warm and dry. Left arm AV fistula noted with good thrill     Capillary Refill: Capillary refill takes less than 2 seconds. Neurological:      General: No focal deficit present. Mental Status: Alert and oriented to person, place, and time. Psychiatric:         Mood and Affect: Mood normal.         Behavior: Behavior normal.     Lab and Diagnostic Study Results   Labs -     Recent Results (from the past 12 hour(s))   EKG, 12 LEAD, INITIAL    Collection Time: 08/13/22 10:48 AM   Result Value Ref Range    Ventricular Rate 58 BPM    Atrial Rate 58 BPM    P-R Interval 166 ms    QRS Duration 92 ms    Q-T Interval 492 ms    QTC Calculation (Bezet) 482 ms    Calculated P Axis 43 degrees    Calculated R Axis -22 degrees    Calculated T Axis 115 degrees    Diagnosis       Sinus bradycardia  Voltage criteria for left ventricular hypertrophy  T wave abnormality, consider lateral ischemia  Prolonged QT  Abnormal ECG  When compared with ECG of 23-MAR-2022 11:28,  T wave inversion less evident in Lateral leads  Confirmed by SORAYA HOGUE, Radha Rivera ([id number]) on 8/13/2022 11:15:29 AM     CBC WITH AUTOMATED DIFF    Collection Time: 08/13/22 11:10 AM   Result Value Ref Range    WBC 3.9 (L) 4.1 - 11.1 K/uL    RBC 3.61 (L) 4.10 - 5.70 M/uL    HGB 11.7 (L) 12.1 - 17.0 g/dL    HCT 36.7 36.6 - 50.3 %    .7 (H) 80.0 - 99.0 FL    MCH 32.4 26.0 - 34.0 PG    MCHC 31.9 30.0 - 36.5 g/dL    RDW 14.9 (H) 11.5 - 14.5 %    PLATELET 754 (L) 029 - 400 K/uL    MPV 9.5 8.9 - 12.9 FL    NRBC 0.0 0.0  WBC    ABSOLUTE NRBC 0.00 0.00 - 0.01 K/uL    NEUTROPHILS 70 32 - 75 %    LYMPHOCYTES 14 12 - 49 %    MONOCYTES 12 5 - 13 %    EOSINOPHILS 3 0 - 7 %    BASOPHILS 1 0 - 1 %    IMMATURE GRANULOCYTES 0 0 - 0.5 %    ABS. NEUTROPHILS 2.8 1.8 - 8.0 K/UL    ABS. LYMPHOCYTES 0.5 (L) 0.8 - 3.5 K/UL    ABS. MONOCYTES 0.5 0.0 - 1.0 K/UL    ABS. EOSINOPHILS 0.1 0.0 - 0.4 K/UL    ABS. BASOPHILS 0.0 0.0 - 0.1 K/UL    ABS. IMM. GRANS. 0.0 0.00 - 0.04 K/UL    DF AUTOMATED     METABOLIC PANEL, COMPREHENSIVE    Collection Time: 08/13/22 11:10 AM   Result Value Ref Range    Sodium 136 136 - 145 mmol/L    Potassium 4.6 3.5 - 5.1 mmol/L    Chloride 101 97 - 108 mmol/L    CO2 32 21 - 32 mmol/L    Anion gap 3 (L) 5 - 15 mmol/L    Glucose 96 65 - 100 mg/dL    BUN 38 (H) 6 - 20 mg/dL    Creatinine 6.21 (H) 0.70 - 1.30 mg/dL    BUN/Creatinine ratio 6 (L) 12 - 20      GFR est AA 11 (L) >60 ml/min/1.73m2    GFR est non-AA 9 (L) >60 ml/min/1.73m2    Calcium 8.0 (L) 8.5 - 10.1 mg/dL    Bilirubin, total 0.4 0.2 - 1.0 mg/dL    AST (SGOT) 25 15 - 37 U/L    ALT (SGPT) 22 12 - 78 U/L    Alk. phosphatase 41 (L) 45 - 117 U/L    Protein, total 6.2 (L) 6.4 - 8.2 g/dL    Albumin 2.7 (L) 3.5 - 5.0 g/dL    Globulin 3.5 2.0 - 4.0 g/dL    A-G Ratio 0.8 (L) 1.1 - 2.2     TROPONIN-HIGH SENSITIVITY    Collection Time: 08/13/22 11:10 AM   Result Value Ref Range    Troponin-High Sensitivity 100 (H) 0 - 76 ng/L   MAGNESIUM    Collection Time: 08/13/22 11:10 AM   Result Value Ref Range    Magnesium 2.7 (H) 1.6 - 2.4 mg/dL       Radiologic Studies -   [unfilled]  CT Results  (Last 48 hours)      None          CXR Results  (Last 48 hours)                 08/13/22 1119  XR CHEST PORT Final result    Impression:      No acute process on portable chest.   Recommendation: Noncontrast CT chest as an outpatient in the near future to   reassess the groundglass nodules identified on CT in March. Narrative:  EXAM:  XR CHEST PORT       INDICATION: Left chest pain and left upper extremity pain. Shortness of breath. COMPARISON: CT chest on 3/23/2022       TECHNIQUE: Upright portable chest AP view       FINDINGS: Pacemaker battery pack and leads are unchanged.  Cardiac monitoring   wires overlie the thorax. Cardiomegaly is unchanged. The pulmonary vasculature   is within normal limits. The lungs and pleural spaces are clear. Elevated right hemidiaphragm is   unchanged. Bones are unchanged. Medical Decision Making and ED Course   - I am the first and primary provider for this patient AND AM THE PRIMARY PROVIDER OF RECORD. I reviewed the vital signs, available nursing notes, past medical history, past surgical history, family history and social history. - Initial assessment performed. The patients presenting problems have been discussed, and the staff are in agreement with the care plan formulated and outlined with them. I have encouraged them to ask questions as they arise throughout their visit. Vital Signs-Reviewed the patient's vital signs. Patient Vitals for the past 12 hrs:   Temp Pulse Resp BP SpO2   08/13/22 1546 -- 67 17 (!) 145/83 100 %   08/13/22 1046 97.9 °F (36.6 °C) 65 19 131/86 99 %         Disposition   Disposition: Admitted to Floor Medical Floor the case was discussed with the admitting physician     Admitted    Diagnosis/Clinical Impression     Clinical Impression:   1. Elevated troponin    2. Chest pain, unspecified type    3. SOB (shortness of breath)        Attestations: I, Hannah Gomez MD, am the primary clinician of record. Please note that this dictation was completed with [email or web address], the computer voice recognition software. Quite often unanticipated grammatical, syntax, homophones, and other interpretive errors are inadvertently transcribed by the computer software. Please disregard these errors. Please excuse any errors that have escaped final proofreading. Thank you.

## 2022-08-13 NOTE — ED TRIAGE NOTES
Dialysis pt has fistula to left arm, presents with pain and swelling to left arm, has some shortness of breath and chest pain. Dialysis mwf, lasted dialyzed yesterday.

## 2022-08-13 NOTE — ED NOTES
Monitoring resumed.  in to inform pt of labs and POC. Pt agrees to stay in hospital. Gingerale and crackers given. Sr up x1. HOB elevated. Call bell within reach.

## 2022-08-13 NOTE — H&P
History and Physical    Subjective:   Chief Complaint : chest pain 2 weeks  Source of information : patient     History of present illness:     67M, H/o ESRD on HD, last HD yesterday, HTN and PPM with chest pain    Intermittently for 2 weeks, sudden, sharp, left sided no radiation, woke him from sleep today    ED:  Increased troponin, no ST-T wave changes on EKD. Denies any heart burn or lifting anything heavty    No past medical history on file. No past surgical history on file. No family history on file. Social History     Tobacco Use    Smoking status: Not on file    Smokeless tobacco: Not on file   Substance Use Topics    Alcohol use: Not on file       Prior to Admission medications    Medication Sig Start Date End Date Taking? Authorizing Provider   acetaminophen (TYLENOL) 325 mg tablet Take 650 mg by mouth three (3) times daily. Provider, Historical   aspirin delayed-release 81 mg tablet Take 81 mg by mouth daily. Provider, Historical   carvediloL (COREG) 25 mg tablet Take 25 mg by mouth two (2) times a day. Provider, Historical   cholecalciferol, vitamin D3, (Vitamin D3) 50 mcg (2,000 unit) tab Take 1 Tablet by mouth daily. Provider, Historical   tamsulosin (FLOMAX) 0.4 mg capsule Take 0.4 mg by mouth daily. Provider, Historical   isosorbide mononitrate ER (IMDUR) 60 mg CR tablet Take 30 mg by mouth every morning. Provider, Historical   levothyroxine (SYNTHROID) 75 mcg tablet Take 75 mcg by mouth Daily (before breakfast). Provider, Historical   b complex-vitamin c-folic acid (NEPHROCAPS) 1 mg capsule Take 1 Capsule by mouth daily. Provider, Historical   sevelamer carbonate (RENVELA) 800 mg tab tab Take 1,600 mg by mouth three (3) times daily (with meals). Provider, Historical   cloNIDine HCL (CATAPRES) 0.1 mg tablet Take 0.1 mg by mouth DIALYSIS PRN.     Provider, Historical   heparin sodium,porcine (heparin, porcine,) 1,000 unit/mL injection 2,750 Units by IntraVENous route DIALYSIS ONCE. Maintenance dose during dialysis. Provider, Historical   heparin sodium,porcine (heparin, porcine,) 1,000 unit/mL injection 1,000 Units by IntraVENous route DIALYSIS ONCE. Initial dose during dialysis. Provider, Historical   amino acids-protein hydrolys (LiquaceL)  gram-kcal/30 mL liqd Take 60 mL by mouth DIALYSIS ONCE. Administered during dialysis. Provider, Historical   cinacalcet (Sensipar) 30 mg tablet Take 120 mg by mouth DIALYSIS ONCE. Administered during dialysis. Provider, Historical   iron sucrose (Venofer) 50 mg iron/2.5 mL soln 200 mg by IntraVENous route DIALYSIS ONCE. Provider, Historical     No Known Allergies          Review of Systems:  Review of Systems  Review of Systems  Constitutional: Negative for chills and fever. HENT: Negative for sinus pressure and sinus pain. Eyes: Negative for photophobia and redness. Respiratory: Positive for shortness of breath and negative for wheezing. Cardiovascular: Positive for chest pain and negative for palpitations. Gastrointestinal: Negative for abdominal pain and nausea. Genitourinary: Negative for flank pain and hematuria. Musculoskeletal: Negative for arthralgias and gait problem. Skin: Negative for color change and pallor. Neurological: Negative for dizziness and weakness    Vitals:   Visit Vitals  BP (!) 145/83 (BP 1 Location: Right upper arm, BP Patient Position: Lying)   Pulse 67   Temp 97.9 °F (36.6 °C)   Resp 17   Ht 6' (1.829 m)   Wt 74.8 kg (165 lb)   SpO2 100%   BMI 22.38 kg/m²       Physical Exam  Constitutional:       General: No acute distress. Appearance: Normal appearance. Not toxic-appearing. HENT:     Head: Normocephalic and atraumatic. Nose: Nose normal.      Mouth/Throat:      Mouth: Mucous membranes are moist.   Eyes:     Extraocular Movements: Extraocular movements intact. Pupils: Pupils are equal, round, and reactive to light.    Cardiovascular:     Rate and Rhythm: Normal rate. Pulses: Normal pulses. Pulmonary:     Effort: Pulmonary effort is normal.      Breath sounds: No stridor, clear to auscultation bilaterally  Abdominal:      General: Abdomen is flat. There is no distension. Musculoskeletal:         General: Normal range of motion. Cervical back: Normal range of motion and neck supple. Skin:     General: Skin is warm and dry. Left arm AV fistula noted with good thrill     Capillary Refill: Capillary refill takes less than 2 seconds. Neurological:     General: No focal deficit present. Mental Status: Alert and oriented to person, place, and time.    Psychiatric:         Mood and Affect: Mood normal.         Behavior: Behavior normal.        Data Review:   Recent Results (from the past 24 hour(s))   EKG, 12 LEAD, INITIAL    Collection Time: 08/13/22 10:48 AM   Result Value Ref Range    Ventricular Rate 58 BPM    Atrial Rate 58 BPM    P-R Interval 166 ms    QRS Duration 92 ms    Q-T Interval 492 ms    QTC Calculation (Bezet) 482 ms    Calculated P Axis 43 degrees    Calculated R Axis -22 degrees    Calculated T Axis 115 degrees    Diagnosis       Sinus bradycardia  Voltage criteria for left ventricular hypertrophy  T wave abnormality, consider lateral ischemia  Prolonged QT  Abnormal ECG  When compared with ECG of 23-MAR-2022 11:28,  T wave inversion less evident in Lateral leads  Confirmed by SORAYA HOGUE, Horris Sandifer ([id number]) on 8/13/2022 11:15:29 AM     CBC WITH AUTOMATED DIFF    Collection Time: 08/13/22 11:10 AM   Result Value Ref Range    WBC 3.9 (L) 4.1 - 11.1 K/uL    RBC 3.61 (L) 4.10 - 5.70 M/uL    HGB 11.7 (L) 12.1 - 17.0 g/dL    HCT 36.7 36.6 - 50.3 %    .7 (H) 80.0 - 99.0 FL    MCH 32.4 26.0 - 34.0 PG    MCHC 31.9 30.0 - 36.5 g/dL    RDW 14.9 (H) 11.5 - 14.5 %    PLATELET 731 (L) 164 - 400 K/uL    MPV 9.5 8.9 - 12.9 FL    NRBC 0.0 0.0  WBC    ABSOLUTE NRBC 0.00 0.00 - 0.01 K/uL    NEUTROPHILS 70 32 - 75 %    LYMPHOCYTES 14 12 - 49 %    MONOCYTES 12 5 - 13 %    EOSINOPHILS 3 0 - 7 %    BASOPHILS 1 0 - 1 %    IMMATURE GRANULOCYTES 0 0 - 0.5 %    ABS. NEUTROPHILS 2.8 1.8 - 8.0 K/UL    ABS. LYMPHOCYTES 0.5 (L) 0.8 - 3.5 K/UL    ABS. MONOCYTES 0.5 0.0 - 1.0 K/UL    ABS. EOSINOPHILS 0.1 0.0 - 0.4 K/UL    ABS. BASOPHILS 0.0 0.0 - 0.1 K/UL    ABS. IMM. GRANS. 0.0 0.00 - 0.04 K/UL    DF AUTOMATED     METABOLIC PANEL, COMPREHENSIVE    Collection Time: 08/13/22 11:10 AM   Result Value Ref Range    Sodium 136 136 - 145 mmol/L    Potassium 4.6 3.5 - 5.1 mmol/L    Chloride 101 97 - 108 mmol/L    CO2 32 21 - 32 mmol/L    Anion gap 3 (L) 5 - 15 mmol/L    Glucose 96 65 - 100 mg/dL    BUN 38 (H) 6 - 20 mg/dL    Creatinine 6.21 (H) 0.70 - 1.30 mg/dL    BUN/Creatinine ratio 6 (L) 12 - 20      GFR est AA 11 (L) >60 ml/min/1.73m2    GFR est non-AA 9 (L) >60 ml/min/1.73m2    Calcium 8.0 (L) 8.5 - 10.1 mg/dL    Bilirubin, total 0.4 0.2 - 1.0 mg/dL    AST (SGOT) 25 15 - 37 U/L    ALT (SGPT) 22 12 - 78 U/L    Alk. phosphatase 41 (L) 45 - 117 U/L    Protein, total 6.2 (L) 6.4 - 8.2 g/dL    Albumin 2.7 (L) 3.5 - 5.0 g/dL    Globulin 3.5 2.0 - 4.0 g/dL    A-G Ratio 0.8 (L) 1.1 - 2.2     TROPONIN-HIGH SENSITIVITY    Collection Time: 08/13/22 11:10 AM   Result Value Ref Range    Troponin-High Sensitivity 100 (H) 0 - 76 ng/L   MAGNESIUM    Collection Time: 08/13/22 11:10 AM   Result Value Ref Range    Magnesium 2.7 (H) 1.6 - 2.4 mg/dL             Assessment and Plan :     (1) NSTEMI: repeat troponin. Aspirin and statin.  ECHO and cardiology consult     (2) ESRD on HD    (3) HTN:  resume home meds    (4) PPM due to abnormal rhythm    DVT ppx: heparin subQ    DISPO: home tomorrow after echo      Signed By: Oanh Ho MD     August 13, 2022

## 2022-08-13 NOTE — PROGRESS NOTES
2 person admission skin assessment performed by [facility] RN and [facility] CLAUDIA, no open wounds noted.   Bruising to left arm

## 2022-08-14 NOTE — CONSULTS
Cardiology Consult    NAME: Katelynn Martell   :  1955   MRN:  [id number]     Date/Time:  2022 12:07 PM    Patient PCP: None  ________________________________________________________________________     Assessment/Plan:   Chest pain, left-sided, sharp, sometimes heavy, with associated shortness of breath, no sweating, precipitated with exertion, relieved with rest.  Mild troponin leak. Repeat EKG. Await echo before deciding on catheterization or stress testing    Sick sinus syndrome, status post permanent pacemaker    Hypertension, longstanding    End-stage renal disease, on hemodialysis for 4 years    Tobacco use occasionally               []        High complexity decision making was performed        Subjective:   CHIEF COMPLAINT:   Chest pain    REASON FOR CONSULT:    Chest pain, mild troponin leak  HISTORY OF PRESENT ILLNESS:     Katelynn Martell is a 79 y.o. BLACK/ male who presents with complaints of chest pain, has been bothering him for the past 2 weeks, on and off with exertion or when he does things with his hands. Associated shortness of breath. Denies sweating. Denies any radiation. Pain is sharp, sometimes heavy. Also has been with increasing leg edema. History of hypertension which is longstanding. Status post permanent pacemaker years ago. Follows up at the [address] [place] [address] Without diabetes or hypercholesterolemia. Continues to smoke, though patient claims its only occasionally. Denies any family history of heart disease. Past Medical History:   Diagnosis Date    Arrhythmia     Chronic kidney disease       Past Surgical History:   Procedure Laterality Date    HX PACEMAKER       No Known Allergies   Meds:  See below  Social History     Tobacco Use    Smoking status: Former     Types: Cigarettes    Smokeless tobacco: Never   Substance Use Topics    Alcohol use: Not Currently      History reviewed. No pertinent family history.     REVIEW OF SYSTEMS:     [] Unable to obtain  ROS due to ---   [x]         Total of 12 systems reviewed as follows:    Constitutional: negative fever, negative chills, negative weight loss  Eyes:   negative visual changes  ENT:   negative sore throat, tongue or lip swelling  Respiratory:  negative cough, negative dyspnea  Cards:              see HPI   GI:   negative for nausea, vomiting, diarrhea, and abdominal pain  Genitourinary: negative for frequency, dysuria  Integument:  negative for rash   Hematologic:  negative for easy bruising and gum/nose bleeding  Musculoskel: negative for myalgias,  back pain  Neurological:  negative for headaches, dizziness, vertigo, weakness  Behavl/Psych: negative for feelings of anxiety, depression     Pertinent Positives include :    Objective:      Physical Exam:    Last 24hrs VS reviewed since prior progress note. Most recent are:    Visit Vitals  BP (!) 158/91   Pulse 65   Temp 97.4 °F (36.3 °C)   Resp 20   Ht 6' (1.829 m)   Wt 74.8 kg (165 lb)   SpO2 99%   BMI 22.38 kg/m²     No intake or output data in the 24 hours ending 08/14/22 1207     General Appearance: Well developed, alert, no acute distress. Ears/Nose/Mouth/Throat: Pupils equal and round, Hearing grossly normal.  Neck: Supple. JVP within normal limits. Carotids good upstrokes, with no bruit. Chest: Lungs clear to auscultation bilaterally. Cardiovascular: Regular rate and rhythm, S1S2 normal, 2/6 systolic murmur best heard in the left parasternal lower border, no  rubs, gallops. Abdomen: Soft, non-tender, bowel sounds are active. No organomegaly. Extremities: 2+ edema bilaterally. Femoral pulses +2, Distal Pulses +1. Skin: Warm and dry. Neuro: Alert   Psychiatric: Cooperative,  []         Post-cath site without hematoma, bruit, tenderness, or thrill. Distal pulses intact. Data:      Telemetry:    EKG:  []  No new EKG for review.     EKG sinus rhythm, nonspecific lateral T wave changes  Chest x-ray without acute changes    Prior to Admission medications    Medication Sig Start Date End Date Taking? Authorizing Provider   acetaminophen (TYLENOL) 325 mg tablet Take 650 mg by mouth three (3) times daily. Provider, Historical   aspirin delayed-release 81 mg tablet Take 81 mg by mouth daily. Provider, Historical   carvediloL (COREG) 25 mg tablet Take 25 mg by mouth two (2) times a day. Provider, Historical   cholecalciferol, vitamin D3, (Vitamin D3) 50 mcg (2,000 unit) tab Take 1 Tablet by mouth daily. Provider, Historical   tamsulosin (FLOMAX) 0.4 mg capsule Take 0.4 mg by mouth daily. Provider, Historical   isosorbide mononitrate ER (IMDUR) 60 mg CR tablet Take 30 mg by mouth every morning. Provider, Historical   levothyroxine (SYNTHROID) 75 mcg tablet Take 75 mcg by mouth Daily (before breakfast). Provider, Historical   b complex-vitamin c-folic acid (NEPHROCAPS) 1 mg capsule Take 1 Capsule by mouth daily. Provider, Historical   sevelamer carbonate (RENVELA) 800 mg tab tab Take 1,600 mg by mouth three (3) times daily (with meals). Provider, Historical   cloNIDine HCL (CATAPRES) 0.1 mg tablet Take 0.1 mg by mouth DIALYSIS PRN. Provider, Historical   heparin sodium,porcine (heparin, porcine,) 1,000 unit/mL injection 2,750 Units by IntraVENous route DIALYSIS ONCE. Maintenance dose during dialysis. Provider, Historical   heparin sodium,porcine (heparin, porcine,) 1,000 unit/mL injection 1,000 Units by IntraVENous route DIALYSIS ONCE. Initial dose during dialysis. Provider, Historical   amino acids-protein hydrolys (LiquaceL)  gram-kcal/30 mL liqd Take 60 mL by mouth DIALYSIS ONCE. Administered during dialysis. Provider, Historical   cinacalcet (Sensipar) 30 mg tablet Take 120 mg by mouth DIALYSIS ONCE. Administered during dialysis. Provider, Historical   iron sucrose (Venofer) 50 mg iron/2.5 mL soln 200 mg by IntraVENous route DIALYSIS ONCE.     Provider, Historical       Recent Results (from the past 24 hour(s))   TROPONIN-HIGH SENSITIVITY    Collection Time: 08/13/22  5:25 PM   Result Value Ref Range    Troponin-High Sensitivity 93 (H) 0 - 76 ng/L   TROPONIN-HIGH SENSITIVITY    Collection Time: 08/14/22 10:32 AM   Result Value Ref Range    Troponin-High Sensitivity 81 (H) 0 - 76 ng/L   ECHO ADULT COMPLETE    Collection Time: 08/14/22 11:37 AM   Result Value Ref Range    LV EDV A2C 137 mL    LV EDV A4C 113 mL    LV ESV A2C 77 mL    LV ESV A4C 79 mL    IVSd 1.7 (A) 0.6 - 1.0 cm    LVIDd 5.7 4.2 - 5.9 cm    LVIDs 4.6 cm    LVOT Diameter 2.1 cm    LVOT Mean Gradient 2 mmHg    LVOT VTI 19.6 cm    LVOT Peak Velocity 0.9 m/s    LVOT Peak Gradient 3 mmHg    LVPWd 1.9 (A) 0.6 - 1.0 cm    LV E' Lateral Velocity 5 cm/s    LV E' Septal Velocity 5 cm/s    LV Ejection Fraction A2C 44 %    LV Ejection Fraction A4C 30 %    EF BP 35 (A) 55 - 100 %    LVOT Area 3.5 cm2    LVOT SV 68.0 ml    LA Minor Axis 6.0 cm    LA Major Axis 5.5 cm    LA Area 2C 31.4 cm2    LA Area 4C 21.7 cm2    LA Volume  (A) 18 - 58 mL    LA Diameter 5.5 cm    RA Area 4C 25.9 cm2    RA Volume 96 ml    AR .0 ms    AR Max Velocity PISA 4.4 m/s    AV Peak Velocity 2.0 m/s    AV Peak Gradient 16 mmHg    AV Mean Gradient 9 mmHg    AV VTI 42.0 cm    AV Mean Velocity 1.4 m/s    AV Area by VTI 1.6 cm2    AV Area by Peak Velocity 1.4 cm2    Aortic Root 3.7 cm    IVC Expiration 2.8 cm    IVC Inspiration 2.4 cm    Main Pulmonary Artery Diameter 2.8 cm    MV Nyquist Velocity 30 cm/s    MR Radius PISA 0.90 cm    MR .0 cm    MR Peak Velocity 5.3 m/s    MR Peak Gradient 114 mmHg    MV EROA PISA 0.3 cm2    MR Regurg Volume 53.00 mL    SD Max Velocity 1.3 m/s    Pulmonary Artery EDP 6 mmHg    PV Max Velocity 1.3 m/s    PV Peak Gradient 7 mmHg    Pulm Vein A Duration 169.0 ms    Pulm Vein A Velocity 0.3 m/s    Pulm Vein Peak D Velocity 0.7 m/s    Pulm Vein Peak S Velocity 0.5 m/s    Pulm Vein S/D 0.8 no units    RVIDd 3.7 cm    TAPSE 1.9 1.7 cm    TR Max Velocity 3.94 m/s    TR Peak Gradient 62 mmHg        Michael Arizmendi MD

## 2022-08-14 NOTE — PROGRESS NOTES
Reason for Admission: chest and arm pain                      RUR Score:  12%                   Plan for utilizing home health:   none at this time       PCP: First and Last name:  [facility] [place]     Name of Practice:    Are you a current patient: Yes/No:    Approximate date of last visit: 5 months   Can you participate in a virtual visit with your PCP:                     Current Advanced Directive/Advance Care Plan: Full Code      Healthcare Decision Maker:   Click here to complete [address] including selection of the [address] Relationship (ie \"Primary\")         Poornima Menon [phone]                    Transition of Care Plan:  pt states that he lives in a boarding house on [address] [place] [address] in [place]. He reported ind with ADLs and assist with IADLs. He uses a transport company contracted by the [place]. He will need transport set up for him at [place]. Denies DMEs  He utilizes a mail order program through the [place] for his meds.

## 2022-08-14 NOTE — DISCHARGE SUMMARY
Physician Discharge Summary     Patient ID:    Jose Schaefer  [id number]  64 y.o.  1955    Admit date: 8/13/2022    Discharge date : 8/14/2022    Chronic Diagnoses:    Problem List as of 8/14/2022 Never Reviewed            Codes Class Noted - Resolved    NSTEMI (non-ST elevated myocardial infarction) ([facility] [place] [address] ICD-10-CM: I21.4  ICD-9-CM: 410.70  8/13/2022 - Present        Fever ICD-10-CM: R50.9  ICD-9-CM: 780.60  3/23/2022 - Present       22    Final Diagnoses:   NSTEMI (non-ST elevated myocardial infarction) ([facility] [place] [address] [I21.4]    Reason for Hospitalization:  (1) NSTEMI: repeat troponin. Aspirin and statin. ECHO and cardiology consult     (2) ESRD on HD     (3) HTN:  resume home meds     (4) PPM due to abnormal rhythm      Hospital Course:       67M, H/o ESRD on HD, last HD yesterday, HTN and PPM with chest pain     Intermittently for 2 weeks, sudden, sharp, left sided no radiation, woke him from sleep today     ED:  Increased troponin, no ST-T wave changes on EKD. Denies any heart burn or lifting anything heavty          Discharge Medications:   Current Discharge Medication List        CONTINUE these medications which have NOT CHANGED    Details   acetaminophen (TYLENOL) 325 mg tablet Take 650 mg by mouth three (3) times daily. aspirin delayed-release 81 mg tablet Take 81 mg by mouth daily. carvediloL (COREG) 25 mg tablet Take 25 mg by mouth two (2) times a day. cholecalciferol, vitamin D3, (Vitamin D3) 50 mcg (2,000 unit) tab Take 1 Tablet by mouth daily. tamsulosin (FLOMAX) 0.4 mg capsule Take 0.4 mg by mouth daily. isosorbide mononitrate ER (IMDUR) 60 mg CR tablet Take 30 mg by mouth every morning. levothyroxine (SYNTHROID) 75 mcg tablet Take 75 mcg by mouth Daily (before breakfast). b complex-vitamin c-folic acid (NEPHROCAPS) 1 mg capsule Take 1 Capsule by mouth daily.       sevelamer carbonate (RENVELA) 800 mg tab tab Take 1,600 mg by mouth three (3) times daily (with meals). cloNIDine HCL (CATAPRES) 0.1 mg tablet Take 0.1 mg by mouth DIALYSIS PRN. !! heparin sodium,porcine (heparin, porcine,) 1,000 unit/mL injection 2,750 Units by IntraVENous route DIALYSIS ONCE. Maintenance dose during dialysis. !! heparin sodium,porcine (heparin, porcine,) 1,000 unit/mL injection 1,000 Units by IntraVENous route DIALYSIS ONCE. Initial dose during dialysis. amino acids-protein hydrolys (LiquaceL)  gram-kcal/30 mL liqd Take 60 mL by mouth DIALYSIS ONCE. Administered during dialysis. cinacalcet (Sensipar) 30 mg tablet Take 120 mg by mouth DIALYSIS ONCE. Administered during dialysis. iron sucrose (Venofer) 50 mg iron/2.5 mL soln 200 mg by IntraVENous route DIALYSIS ONCE. !! - Potential duplicate medications found. Please discuss with provider. Follow up Care:    1. None in 1-2 weeks. Please call to set up an appointment shortly after discharge. Diet:  Cardiac Diet    Disposition:  Home. Advanced Directive:   FULL    DNR      Discharge Exam:  Physical Exam  Constitutional:       General: No acute distress. Appearance: Normal appearance. Not toxic-appearing. HENT:     Head: Normocephalic and atraumatic. Nose: Nose normal.      Mouth/Throat:      Mouth: Mucous membranes are moist.   Eyes:     Extraocular Movements: Extraocular movements intact. Pupils: Pupils are equal, round, and reactive to light. Cardiovascular:     Rate and Rhythm: Normal rate. Pulses: Normal pulses. Pulmonary:     Effort: Pulmonary effort is normal.      Breath sounds: No stridor, clear to auscultation bilaterally  Abdominal:      General: Abdomen is flat. There is no distension. Musculoskeletal:         General: Normal range of motion. Cervical back: Normal range of motion and neck supple. Skin:     General: Skin is warm and dry.   Left arm AV fistula noted with good thrill     Capillary Refill: Capillary refill takes less than 2 seconds. Neurological:     General: No focal deficit present. Mental Status: Alert and oriented to person, place, and time. Psychiatric:         Mood and Affect: Mood normal.         Behavior: Behavior normal.    CONSULTATIONS: Cardiology    Significant Diagnostic Studies:     Radiologic Studies -   Results from Hospital Encounter encounter on 08/13/22    XR CHEST PORT    Narrative  EXAM:  XR CHEST PORT    INDICATION: Left chest pain and left upper extremity pain. Shortness of breath. COMPARISON: CT chest on 3/23/2022    TECHNIQUE: Upright portable chest AP view    FINDINGS: Pacemaker battery pack and leads are unchanged. Cardiac monitoring  wires overlie the thorax. Cardiomegaly is unchanged. The pulmonary vasculature  is within normal limits. The lungs and pleural spaces are clear. Elevated right hemidiaphragm is  unchanged. Bones are unchanged. Impression  No acute process on portable chest.  Recommendation: Noncontrast CT chest as an outpatient in the near future to  reassess the groundglass nodules identified on CT in March.      CT Results  (Last 48 hours)      None                Discharge time spent 35 minutes    Signed:  Gena Monson MD  8/14/2022  9:58 AM

## 2022-08-14 NOTE — PROGRESS NOTES
Problem: Falls - Risk of  Goal: *Absence of Falls  Description: Document Janny Skill Fall Risk and appropriate interventions in the flowsheet.   Outcome: Progressing Towards Goal  Note: Fall Risk Interventions:            Medication Interventions: Teach patient to arise slowly                   Problem: Patient Education: Go to Patient Education Activity  Goal: Patient/Family Education  Outcome: Progressing Towards Goal     Problem: General Medical Care Plan  Goal: *Vital signs within specified parameters  Outcome: Progressing Towards Goal  Goal: *Labs within defined limits  Outcome: Progressing Towards Goal  Goal: *Absence of infection signs and symptoms  Outcome: Progressing Towards Goal  Goal: *Optimal pain control at patient's stated goal  Outcome: Progressing Towards Goal  Goal: *Skin integrity maintained  Outcome: Progressing Towards Goal  Goal: *Fluid volume balance  Outcome: Progressing Towards Goal  Goal: *Optimize nutritional status  Outcome: Progressing Towards Goal  Goal: *Anxiety reduced or absent  Outcome: Progressing Towards Goal  Goal: *Progressive mobility and function (eg: ADL's)  Outcome: Progressing Towards Goal     Problem: Patient Education: Go to Patient Education Activity  Goal: Patient/Family Education  Outcome: Progressing Towards Goal

## 2022-08-14 NOTE — PROGRESS NOTES
Problem: Falls - Risk of  Goal: *Absence of Falls  Description: Document Olvin Counts Fall Risk and appropriate interventions in the flowsheet.   Outcome: Progressing Towards Goal  Note: Fall Risk Interventions:            Medication Interventions: Teach patient to arise slowly                   Problem: General Medical Care Plan  Goal: *Vital signs within specified parameters  Outcome: Progressing Towards Goal     Problem: General Medical Care Plan  Goal: *Labs within defined limits  Outcome: Progressing Towards Goal     Problem: General Medical Care Plan  Goal: *Absence of infection signs and symptoms  Outcome: Progressing Towards Goal     Problem: General Medical Care Plan  Goal: *Optimal pain control at patient's stated goal  Outcome: Progressing Towards Goal     Problem: General Medical Care Plan  Goal: *Skin integrity maintained  Outcome: Progressing Towards Goal     Problem: General Medical Care Plan  Goal: *Fluid volume balance  Outcome: Progressing Towards Goal

## 2022-08-15 NOTE — PROGRESS NOTES
Progress Note    Patient: Lexy Hill MRN: [id number]  SSN: [id number]    YOB: 1955  Age: 79 y.o. Sex: male      Admit Date: 8/13/2022    LOS: 1 day     Subjective:       67M, H/o ESRD on HD, last HD yesterday, HTN and PPM with chest pain     Intermittently for 2 weeks, sudden, sharp, left sided no radiation, woke him from sleep today     ED:  Increased troponin, no ST-T wave changes on EKD. Denies any heart burn or lifting anything heavy    HOSPITAL COURSE:  ECHO: Moderately reduced left ventricular systolic function with a visually estimated EF of 35 - 40%, plan for LHC today and then HD thereon. He may go home tomorrow      Review of Systems:  Constitutional:  denies weight loss, no fever, no chills, no night sweats  Eye: No recent visual disturbances, no discharge, no double vision  Ear/nose/mouth/throat : No hearing disturbance, no ear pain, no nasal congestion, no sore throat, no trouble swallowing. Respiratory : No trouble breathing, no cough, no shortness of breath, no hemoptysis, no wheezing  Cardiovascular : +++ chest pain, no palpitation, no racing of heart, no orthopnea, no paroxysmal nocturnal dyspnea, no peripheral edema  Gastrointestinal : No nausea, no vomiting, no diarrhea, constipation, heartburn, abdominal pain  Genitourinary : No dysuria, no hematuria, no increased frequency, incontinence,  Lymphatics : No swollen glands -Neck, axillary, inguinal  Endocrine : No excessive thirst, no polyuria no cold intolerance, no heat intolerance. Immunologic : No hives, urticaria, no seasonal allergies,   Musculoskeletal : Left upper back pain.   No joint swelling, pain, effusion,  no neck pain,   Integumentary : No rash, no pruritus, no ecchymosis  Hematology : No petechiae, No easy bruising,  No tendency to bleed easy  Neurology : Denies change in mental status, no abnormal balance, no headache, no confusion, numbness, tingling,  Psychiatric : No mood swings, no anxiety, depression      Objective:     Vitals:    08/15/22 1145 08/15/22 1215 08/15/22 1245 08/15/22 1313   BP: 118/76 124/74 127/81    Pulse: 68 66 67    Resp:       Temp:    98.5 °F (36.9 °C)   SpO2:       Weight:       Height:            Intake and Output:  Current Shift: No intake/output data recorded. Last three shifts: No intake/output data recorded. Physical Exam  Constitutional:       General: No acute distress. Appearance: Normal appearance. Not toxic-appearing. HENT:     Head: Normocephalic and atraumatic. Nose: Nose normal.      Mouth/Throat:      Mouth: Mucous membranes are moist.   Eyes:     Extraocular Movements: Extraocular movements intact. Pupils: Pupils are equal, round, and reactive to light. Cardiovascular:     Rate and Rhythm: Normal rate. Pulses: Normal pulses. Pulmonary:     Effort: Pulmonary effort is normal.      Breath sounds: No stridor, clear to auscultation bilaterally  Abdominal:      General: Abdomen is flat. There is no distension. Musculoskeletal:         General: Normal range of motion. Cervical back: Normal range of motion and neck supple. Skin:     General: Skin is warm and dry. Left arm AV fistula noted with good thrill     Capillary Refill: Capillary refill takes less than 2 seconds. Neurological:     General: No focal deficit present. Mental Status: Alert and oriented to person, place, and time.    Psychiatric:         Mood and Affect: Mood normal.         Behavior: Behavior normal.      Lab/Data Review:  Recent Results (from the past 24 hour(s))   EKG, 12 LEAD, SUBSEQUENT    Collection Time: 08/14/22  3:20 PM   Result Value Ref Range    Ventricular Rate 63 BPM    Atrial Rate 63 BPM    P-R Interval 188 ms    QRS Duration 98 ms    Q-T Interval 478 ms    QTC Calculation (Bezet) 489 ms    Calculated P Axis 50 degrees    Calculated R Axis -29 degrees    Calculated T Axis 95 degrees    Diagnosis       Normal sinus rhythm  Moderate voltage criteria for LVH, may be normal variant  Nonspecific T wave abnormality  Prolonged QT  Abnormal ECG  When compared with ECG of 13-AUG-2022 10:48,  No significant change was found  Confirmed by Flex Mondragon MD, Tien Roberts ([id number]) on 8/14/2022 3:29:47 PM           Assessment and plan:      (1) NSTEMI: repeat troponin. Aspirin and statin.  ECHO and cardiology consult and plans for [facility] today     (2) ESRD on HD: HD monday     (3) HTN:  resume home meds     (4) PPM due to abnormal rhythm    (5) CHFrEF: as per cards    DVT ppx: lovenox     DISPO: home tomorrow    Signed By: Wolf Rowe MD     August 15, 2022

## 2022-08-15 NOTE — PROGRESS NOTES
Dr. Steve Aldana came to nurses station and stated that patient denied heart cath and would like to go to the [facility] for further treatment. OK to discharge patient. Dr. Joann Montano called to relay same information. TORB for discharge order home w/self care this evening. Patient states he will [employer] cab voucher. Cab scheduled to be here around 7 p.m.

## 2022-08-15 NOTE — PROGRESS NOTES
Progress Note      8/15/2022 6:06 PM  NAME: Wero Osborn   MRN:  [id number]   Admit Diagnosis: NSTEMI (non-ST elevated myocardial infarction) [facility]) [I21.4]          Assessment/Plan:     Atypical chest with mild troponin leak. patient today in the p.m. was scheduled for cardiac catheterization which he refused. He told the nursing staff that he cannot stay hungry that long and now wants to have this procedure done at [facility] as an outpatient. Patient currently is free of any anginal chest pain and hemodynamically stable. Congestive cardiomyopathy with baseline LV ejection fraction of 35 to 40% as per echocardiogram yesterday. Patient well compensated from the standpoint of congestive heart failure. Continue current management with hemodialysis to keep patient euvolemic. 3.   Sick sinus syndrome, status post permanent pacemaker    4. Hypertension, relatively stable on present antihypertensive regimen. 5.    Severe pulmonary hypertension    6. End-stage renal disease, on hemodialysis for 4 years    7. Patient's care plan and his refusal to undergo cardiac catheterization discussed with attending physician Dr. Radha James                 []       High complexity decision making was performed in this patient at high risk for decompensation with multiple organ involvement. Subjective:     Wero Osborn denies chest pain, dyspnea. Discussed with RN events overnight. Review of Systems:    ROS     Objective:      Physical Exam:    Last 24hrs VS reviewed since prior progress note.  Most recent are:    Visit Vitals  BP (!) 146/74 (BP 1 Location: Right upper arm, BP Patient Position: Sitting)   Pulse 72   Temp 98.1 °F (36.7 °C)   Resp 16   Ht 6' (1.829 m)   Wt 74.5 kg (164 lb 3.9 oz)   SpO2 98%   BMI 22.28 kg/m²       Intake/Output Summary (Last 24 hours) at 8/15/2022 1806  Last data filed at 8/15/2022 1335  Gross per 24 hour   Intake --   Output 2500 ml   Net -2500 ml Physical Exam:  Constitutional: Well developed well-nourished patient who appeared in no acute distress  HEENT: Normocephalic and atraumatic. Extraocular movement intact. Pupil reactive to light bilaterally  Neck: Supple without thyromegaly  Lungs: Scattered rhonchi diffusely  Heart:  Regular rhythm, normal S1-S2.  Jugular venous distention absent. Carotid bruits absent. PMI in fifth intercostal space on left midclavicular line. Extremities  Trace edema bilaterally  Abdomen: Soft nontender nondistended hypoactive bowel sounds  Skin: Dry and warm    []         Post-cath site without hematoma, bruit, tenderness, or thrill. Distal pulses intact. PMH/SH reviewed - no change compared to H&P    Data Review    Telemetry: normal sinus rhythm     EKG:   []  No new EKG for review    DUPLEX UPPER EXT VENOUS LEFT   Final Result      XR CHEST PORT   Final Result      No acute process on portable chest.   Recommendation: Noncontrast CT chest as an outpatient in the near future to   reassess the groundglass nodules identified on CT in March. No results found for this or any previous visit (from the past 24 hour(s)). Echo:   08/13/22    ECHO ADULT COMPLETE 08/14/2022 8/14/2022    Interpretation Summary  Formatting of this result is different from the original.      Left Ventricle: Moderately reduced left ventricular systolic function with a visually estimated EF of 35 - 40%. EF by 2D Simpsons Biplane is 35%. Left ventricle size is normal. Moderately increased wall thickness. Findings consistent with severe concentric hypertrophy. Moderate global hypokinesis present. Grade II diastolic dysfunction with increased LAP. Right Ventricle: Right ventricle is mildly dilated. Normal wall thickness. Lead present in the right ventricle. Aortic Valve: Tricuspid valve. Moderately thickened cusp. Moderate sclerosis of the aortic valve cusp. Moderate regurgitation. No significant stenosis.  AV mean gradient is 9 mmHg. AV peak gradient is 16 mmHg. AV peak velocity is 2.0 m/s. Mitral Valve: Moderately thickened leaflet. Mildly calcified leaflet. Moderate regurgitation. Tricuspid Valve: Moderate regurgitation. Severely elevated RVSP. The estimated RVSP is 77 mmHg. Pulmonic Valve: Mildly thickened cusps. Left Atrium: Left atrium is severely dilated. Left atrial volume index is severely increased (>48 mL/m2). LA Vol Index is  52 ml/m2. Right Atrium: Lead present in the right atrium. Right atrium is moderately dilated. Pulmonary Arteries: Main pulmonary artery is moderately dilated. Addendum by: Lori Walsh MD on 8/14/2022  2:33 PM    Signed by: Bhanu Fuentes MD on 8/14/2022  2:27 PM      Patient's  EKG, laboratory data and echocardiogram were individually reviewed by me. Lab Data:    Recent Labs     08/13/22  1110   WBC 3.9*   HGB 11.7*   HCT 36.7   *     No results for input(s): INR, PTP, APTT, INREXT in the last 72 hours. Recent Labs     08/13/22  1110      K 4.6      CO2 32   BUN 38*   CREA 6.21*   GLU 96   CA 8.0*   MG 2.7*     No results for input(s): CPK, CKNDX, TROIQ in the last 72 hours. No lab exists for component: CPKMB  No results found for: CHOL, CHOLX, CHLST, CHOLV, HDL, HDLP, LDL, LDLC, DLDLP, TGLX, TRIGL, TRIGP, CHHD, CHHDX    Recent Labs     08/13/22  1110   AP 41*   TP 6.2*   ALB 2.7*   GLOB 3.5     No results for input(s): PH, PCO2, PO2 in the last 72 hours.     Medications Personally Reviewed:    Current Facility-Administered Medications   Medication Dose Route Frequency    acetaminophen (TYLENOL) tablet 650 mg  650 mg Oral TID    aspirin delayed-release tablet 81 mg  81 mg Oral DAILY    carvediloL (COREG) tablet 25 mg  25 mg Oral BID    cinacalcet (SENSIPAR) tablet 120 mg  120 mg Oral DIALYSIS ONCE    cloNIDine HCL (CATAPRES) tablet 0.1 mg  0.1 mg Oral DIALYSIS PRN    isosorbide mononitrate ER (IMDUR) tablet 30 mg  30 mg Oral 7am    levothyroxine (SYNTHROID) tablet 75 mcg  75 mcg Oral ACB    sevelamer carbonate (RENVELA) tab 1,600 mg  1,600 mg Oral TID WITH MEALS    tamsulosin (FLOMAX) capsule 0.4 mg  0.4 mg Oral DAILY    sodium chloride (NS) flush 5-40 mL  5-40 mL IntraVENous Q8H    sodium chloride (NS) flush 5-40 mL  5-40 mL IntraVENous PRN    acetaminophen (TYLENOL) tablet 650 mg  650 mg Oral Q6H PRN    Or    acetaminophen (TYLENOL) suppository 650 mg  650 mg Rectal Q6H PRN    polyethylene glycol (MIRALAX) packet 17 g  17 g Oral DAILY PRN    ondansetron (ZOFRAN ODT) tablet 4 mg  4 mg Oral Q8H PRN    Or    ondansetron (ZOFRAN) injection 4 mg  4 mg IntraVENous Q6H PRN    heparin (porcine) injection 5,000 Units  5,000 Units SubCUTAneous Q8H    atorvastatin (LIPITOR) tablet 40 mg  40 mg Oral DAILY    nitroglycerin (NITROSTAT) tablet 0.4 mg  0.4 mg SubLINGual PRN    famotidine (PEPCID) tablet 10 mg  10 mg Oral BID         Prior to Admission medications    Medication Sig Start Date End Date Taking? Authorizing Provider   acetaminophen (TYLENOL) 325 mg tablet Take 650 mg by mouth three (3) times daily. Provider, Historical   aspirin delayed-release 81 mg tablet Take 81 mg by mouth daily. Provider, Historical   carvediloL (COREG) 25 mg tablet Take 25 mg by mouth two (2) times a day. Provider, Historical   cholecalciferol, vitamin D3, (Vitamin D3) 50 mcg (2,000 unit) tab Take 1 Tablet by mouth daily. Provider, Historical   tamsulosin (FLOMAX) 0.4 mg capsule Take 0.4 mg by mouth daily. Provider, Historical   isosorbide mononitrate ER (IMDUR) 60 mg CR tablet Take 30 mg by mouth every morning. Provider, Historical   levothyroxine (SYNTHROID) 75 mcg tablet Take 75 mcg by mouth Daily (before breakfast). Provider, Historical   b complex-vitamin c-folic acid (NEPHROCAPS) 1 mg capsule Take 1 Capsule by mouth daily. Provider, Historical   sevelamer carbonate (RENVELA) 800 mg tab tab Take 1,600 mg by mouth three (3) times daily (with meals). Provider, Historical   cloNIDine HCL (CATAPRES) 0.1 mg tablet Take 0.1 mg by mouth DIALYSIS PRN. Provider, Historical   heparin sodium,porcine (heparin, porcine,) 1,000 unit/mL injection 2,750 Units by IntraVENous route DIALYSIS ONCE. Maintenance dose during dialysis. Provider, Historical   heparin sodium,porcine (heparin, porcine,) 1,000 unit/mL injection 1,000 Units by IntraVENous route DIALYSIS ONCE. Initial dose during dialysis. Provider, Historical   amino acids-protein hydrolys (LiquaceL)  gram-kcal/30 mL liqd Take 60 mL by mouth DIALYSIS ONCE. Administered during dialysis. Provider, Historical   cinacalcet (Sensipar) 30 mg tablet Take 120 mg by mouth DIALYSIS ONCE. Administered during dialysis. Provider, Historical   iron sucrose (Venofer) 50 mg iron/2.5 mL soln 200 mg by IntraVENous route DIALYSIS ONCE.     Provider, Historical       Clinical care time spent 30 minutes      Simon Schaefer MD

## 2022-08-15 NOTE — PROGRESS NOTES
Problem: Falls - Risk of  Goal: *Absence of Falls  Description: Document Mami Embs Fall Risk and appropriate interventions in the flowsheet.   Outcome: Progressing Towards Goal  Note: Fall Risk Interventions:            Medication Interventions: Teach patient to arise slowly                   Problem: General Medical Care Plan  Goal: *Labs within defined limits  Outcome: Progressing Towards Goal     Problem: General Medical Care Plan  Goal: *Optimize nutritional status  Outcome: Progressing Towards Goal     Problem: General Medical Care Plan  Goal: *Anxiety reduced or absent  Outcome: Progressing Towards Goal

## 2022-08-15 NOTE — PROGRESS NOTES
Patient discharge instructions given and patient verbalizes understanding. IV removed per protocol. No bleeding noted. Belongings with patient.     Waiting for cab

## 2022-08-15 NOTE — PROGRESS NOTES
Cath lab called and stated pt on the schedule for cath today. Nurse was previously informed pt on the schedule for tomorrow. Pt informed and refused cath. Stated he was hungry and had already missed lunch because of dialysis and he was done for the day. Cath lab on phone throughout conversation with the pt. Cath lab nurse notified Dr. Jaymie Castro.

## 2022-10-02 PROBLEM — E16.2 HYPOGLYCEMIA: Status: ACTIVE | Noted: 2022-01-01

## 2022-10-02 NOTE — PROCEDURES
Procedure Note - Arterial Access:   Performed by Ozzy Carrasquillo DO. Diagnosis: shock  Insertion Date: 10/02/22   Time: 12:19 AM   Obtained Consent? no; emergent   Procedure Location:  ICU    Immediately prior to the procedure, the patient was reevaluated and found suitable for the planned procedure and any planned medications. Immediately prior to the procedure a time out was called to verify the correct patient, procedure, equipment, staff, and marking as appropriate. Collateral circulation confirmed with Genice Clarkton test.     Line Bundle:  Full sterile barrier precautions used. 5 mL 1% Lidocaine placed at insertion site. Method: Seldinger technique. Site Prep: Chloraprep, sterile drapes  Procedure: Arterial Catheter Insertion in Right, Femoral Artery   Catheter New site  Number of Attempts:  1  Indication: Monitoring  There was bright red, pulsatile blood return. Femoral Site? yes. If Yes, reason femoral site was chosen: n/a  Catheter secured. Biopatch/CHG sterile bio-occlusive dressing  in place? yes  Complication None  The procedure was tolerated well.     Ozzy Carrasquillo DO   Critical Care Medicine  [facility]

## 2022-10-02 NOTE — PROGRESS NOTES
Spiritual Care Assessment/Progress Note  [facility]      NAME: Linda Deleon      MRN: [id number]  AGE: 79 y.o. SEX: male  Taoist Affiliation: Christian   Language: English     10/2/2022     Total Time (in minutes): 25     Spiritual Assessment begun in [facility] [place] [facility] CV INTNSV CARE through conversation with:         []Patient        [] Family    [] Friend(s)        Reason for Consult: Death, Inpatient     Spiritual beliefs: (Please include comment if needed)     [] Identifies with a columba tradition:         [] Supported by a columba community:            [] Claims no spiritual orientation:           [] Seeking spiritual identity:                [] Adheres to an individual form of spirituality:           [x] Not able to assess:                           Identified resources for coping:      [] Prayer                               [] Music                  [] Guided Imagery     [] Family/friends                 [] Pet visits     [] Devotional reading                         [x] Unknown     [] Other:                                               Interventions offered during this visit: (See comments for more details)    Patient Interventions: Other (comment) (Death)           Plan of Care:     [] Support spiritual and/or cultural needs    [] Support AMD and/or advance care planning process      [] Support grieving process   [] Coordinate Rites and/or Rituals    [] Coordination with community clergy   [] No spiritual needs identified at this time   [] Detailed Plan of Care below (See Comments)  [] Make referral to Music Therapy  [] Make referral to Pet Therapy     [] Make referral to Addiction services  [] Make referral to [facility]  [] Make referral to Spiritual Care Partner  [] No future visits requested        [x] Follow up visits as needed     Responded to notification of patient's death. No family present. Nurse reports that attempts have bee made to locate [facility] without success as of now.  Requested nurse to yasmine guidry if family arrives.    Chaplain Schneider MDiv, MS, [facility]

## 2022-10-02 NOTE — PROGRESS NOTES
Problem: Falls - Risk of  Goal: *Absence of Falls  Description: Document Darlen Topping Fall Risk and appropriate interventions in the flowsheet.   Outcome: Progressing Towards Goal  Note: Fall Risk Interventions:

## 2022-10-02 NOTE — ED NOTES
Patient resting on stretcher. Patient currently on nasal cannula at 6lpm after he was taken off of bipap to go to CT. This was delayed because patient's BGL had dropped. He is still being treated for his ongoing low blood sugar. Patient's oxygen saturation, when readable, is %. His extremities have a cap refill of greater than 3 seconds making it difficult to  his oxygen sats.

## 2022-10-02 NOTE — ED NOTES
Patient is now asking to have the bipap removed. This nurse has explained the risks of removing the bipap and patient agrees to leave it on.

## 2022-10-02 NOTE — ED TRIAGE NOTES
Patient c/o increasing shob and dizziness over the last 2 days. Patient missed his dialysis Friday. Patient was hypotensive and diaphoretic for EMS.

## 2022-10-02 NOTE — PROCEDURES
Procedure Note - Central Venous Access:   Performed by Vini San DO  Diagnosis: RRT  Insertion Date: 10/02/22  Time:9:57 AM  Obtained Consent? no; emergent   Procedure Location:  ICU    Immediately prior to the procedure, the patient was reevaluated and found suitable for the planned procedure and any planned medications. Immediately prior to the procedure a time out was called to verify the correct patient, procedure, equipment, staff, and marking as appropriate. Central line Bundle:  Full sterile barrier precautions used. 7-Step Sterility Protocol followed. (cap, mask sterile gown, sterile gloves, large sterile sheet, hand hygiene, 2% chlorhexidine for cutaneous antisepsis)  5 mL 1% Lidocaine placed at insertion site. Patient positioned in Trendelenburg? Yes  The site was prepped with {Chlorhexidine  Catheter inserted into a new site. Using Seldinger technique a Arrow Triple Lumen CVC was placed in the Right, Femoral Vein via direct cannulation with 1 number of attempts. Ultrasound Guidance was utilized. There was good dark, non-pulsatile blood return in all ports. Catheter secured. Biopatch/CHG bio-occlusive dressing in place? Yes  The following complications were encountered: None. A follow-up chest x-ray is pending  The procedure was tolerated well.     Vini San DO  Critical Care Medicine  [facility]

## 2022-10-02 NOTE — PROCEDURES
[facility]      Procedure Note - Intubation:   Performed by Clare Osborn DO .   Staff Anesthesiologist/Intensivist    Diagnosis: CODE BLUE   Insertion Date: 8/31/22 Time:0130   Obtained Consent? No, emergent   Procedure Location:  ICU. Immediately prior to the procedure, the patient was reevaluated and found suitable for the planned procedure and any planned medications. Immediately prior to the procedure a time out was called to verify the correct patient, procedure, equipment, staff, and marking as appropriate. Medications given were 0 Deon, 0 etomidate. A number 7.5 ETT was placed to 22 cm at the teeth. Placement was evaluated by noting bilateral, symmetric breath sounds, good end-tidal CO2 detector color change , no breath sounds over stomach, and bulb aspirator expands promptly. Attempts required: 1 . Complications: none . RSI was used. .  The procedure was tolerated well. CXR is pending.     [facility]  Staff [address] [facility] Critical Care  10/2/2022

## 2022-10-02 NOTE — ED NOTES
Due to communication issues with the transfer center, the air ambulance crew still has not arrived. This has caused a severe delay in patient care.

## 2022-10-02 NOTE — H&P
CRITICAL CARE NOTE      Name: Surya Ho   : 1955   MRN: [id number]   Date: 10/2/2022      REASON FOR ICU ADMISSION:  HyperK, Hypotensive, Hypoglycemia    PRINCIPAL ICU DIAGNOSIS   Hypotension  AHRF  NSTEMI  Refractory Hypoglycemia  ? Adrenal Insufficiency  Hyperkalemia  ESRD        HPI   67M with ESRD MWF, missed this past Friday, HTN, HFrEf 35%, SSS with PPM, severe pHTNwho presented to [facility] with SOB requiring Bipap. Found to have hypoglycemia in 30s refractory to D10 x3, started on D10 gtt. Hyperkalemic. CT PE negative. Transferred to [facility] for further management. ASSESSMENT & PLAN     Neuro  APAP PRN for pain      Cardiac  Telemetry  EKG, Last Qtc pending  Goal MAP>65, I am actively titrating pressors to goal  TTE pending      Pulmonary  Head of Bed >30  IS   Goal SpO2 >92%, I am actively titrating oxygen to goal  Nebs PRN  CXR    GI  ADAT  No ppx    Renal  ESRD, Nephrology consulted  Access LUE AVF patent  HyperK cocktail if glucose adequate    Endocrine  SSI   Goal glucose 140-180  On D10 gtt  ? Adrenal insufficiency with hypoglycemia and hypotension -- Give Dex 10mg and get AM cortisol    ID  No active infection  Follow up blood cultures    Heme  Hep gtt for NSTEMI  Cardiology consulted    Lines- PIV  Trotter - no  DVT- hep gtt  SUP - no  Diet - DIET NPO  Mobility - PTOT  Pt Contact - undetermined  Dispo - ICU    Code Status - FULL    OBJECTIVE     Labs and Data: Reviewed 10/02/22  Medications: Reviewed 10/02/22  Imaging: Reviewed 10/02/22    There were no vitals taken for this visit.      Temp (24hrs), Av.3 °F (36.8 °C), Min:98.3 °F (36.8 °C), Max:98.3 °F (36.8 °C)             Intake/Output:   No intake or output data in the 24 hours ending 10/02/22 0409      General - chronically ill, on bipap  HEENT- pupils equal, EOMI, anicteric  Neuro - moves all extrem, conversant, follows basic commands, no focal deficit  CV - RRR  Resp - on bipap, no wheeze  Abd - soft, nontender, no guarding, LUE AVF patent with thrill   - no schmidt  MSK- intact, no sacral ulcer    All Micro Results       None             Imaging  Key imaging reviewed           CRITICAL CARE DOCUMENTATION  I had a face to face encounter with the patient, reviewed and interpreted patient data including clinical events, labs, images, vital signs, I/O's, and examined patient. I have discussed the case and the plan and management of the patient's care with the consulting services, the bedside nurses and the respiratory therapist.      NOTE OF PERSONAL INVOLVEMENT IN CARE   This patient has a high probability of imminent, clinically significant deterioration, which requires the highest level of preparedness to intervene urgently. I participated in the decision-making and personally managed or directed the management of the following life and organ supporting interventions that required my frequent assessment to treat or prevent imminent deterioration. I personally spent 75 minutes of critical care time. This is time spent at this critically ill patient's bedside actively involved in patient care as well as the coordination of care. This does not include any procedural time which has been billed separately.     Tyrese Schaeffer DO  Staff [address] [facility] Critical Care  10/2/2022

## 2022-10-02 NOTE — PROGRESS NOTES
0015:  TRANSFER - IN REPORT:    Verbal report received from [address], RN(name) on [employer]  being received from [facility] ED(unit) for urgent transfer      Report consisted of patients Situation, Background, Assessment and   Recommendations(SBAR). Information from the following report(s) SBAR, Kardex, ED Summary, MAR, Recent Results was reviewed with the receiving nurse. Opportunity for questions and clarification was provided. Patient will be arriving by air and RN will call when patient is picked up. 0040: Update from nursing supervisor patient will arrive by squad due to weather. 0240: Patient in route to [facility].     0315: Patient arrived to CVICU on stretcher with 3 EMS members. Patient transferred to bed, IV tubings changed and fluids restarted. Patient bathed, noted to have +BM. Primary Nurse Seth Greer RN and Denver Mcbride RN performed a dual skin assessment on this patient Impairment noted- see wound doc flow sheet  Fred score is 16. Admitting notified of admission. 0410: Dr. Fly Reinoso bedside. Orders received. SpO2 not giving readings. 0440: Labs drawn. Patient placed on High flow NC per Dr. Fly Renioso. Blood cultures from right hand and from arterial stick to right brachial.      0515: BG 31. Patient more somulent. RT paged for repeat ABG. 6102: Dr. Fly Reinoso notified of rhythm changes. Patient now pacing at rate of 40.      0525: Patient not breathing. BIPAP removed, code blue called, chest compressions started. See code blue documentation. 6007: Second code blue called. See documentation. 9588: Patient unable to maintain bp despite Epi and Levo. 1681: Time of death. 0740: Life net notified. Unable to get in touch with son. 0900: Investigator Mookie requests medical examiner hold.

## 2022-10-02 NOTE — ED NOTES
After two attempts at a central line placement in patient's right neck, provider then attempted placement of EJ with two attempts on same side. Patient's right neck noted to be swollen by other staff so decision was made to remove the INT in right EJ and pressure applied. Patient states no difficulty swallowing at this time.

## 2022-10-02 NOTE — PROGRESS NOTES
Attempted to reach out to son x2. Left VM asking to call the hospital for update.      Regla [place]   Staff [address] [facility] Critical Care  10/2/2022

## 2022-10-02 NOTE — ED NOTES
Attempted to call report to Michael Swan RN at [facility]. I was told that she was told that the patient was coming from [place], [place] and wouldn't be here until morning. She also stated that they wouldn't have a nurse to take care of the patient until morning. Michael Swan was calling her house supervisor, leaving me on hold, while I notified my charge nurse, Anaya Beauchamp. She contacted transfer center who had no idea where that idea came from and would call us back. Rogelio Munoz also called to say that they did not have an available ambulance for a critical care patient.

## 2022-10-02 NOTE — ED PROVIDER NOTES
EMERGENCY DEPARTMENT HISTORY AND PHYSICAL EXAM      Date: 10/1/2022  Patient Name: Surya Ho    History of Presenting Illness     Chief Complaint   Patient presents with    Dizziness       History Provided By: Patient    HPI: Surya Ho, 79 y.o. male past medical history of ESRD on hemodialysis and arrhythmia presents for evaluation of difficulty breathing. Patient reports symptoms have been present and worsening over the last 2 days. His last dialysis session was Wednesday and he missed his session yesterday due to having other things to do. He denies any sick contacts, denies any chest pain with cough, denies any  or GI symptoms. There are no other complaints, changes, or physical findings at this time. PCP: None    No current facility-administered medications on file prior to encounter. Current Outpatient Medications on File Prior to Encounter   Medication Sig Dispense Refill    acetaminophen (TYLENOL) 325 mg tablet Take 650 mg by mouth three (3) times daily.  aspirin delayed-release 81 mg tablet Take 81 mg by mouth daily.  carvediloL (COREG) 25 mg tablet Take 25 mg by mouth two (2) times a day.  cholecalciferol, vitamin D3, (Vitamin D3) 50 mcg (2,000 unit) tab Take 1 Tablet by mouth daily.  tamsulosin (FLOMAX) 0.4 mg capsule Take 0.4 mg by mouth daily.  isosorbide mononitrate ER (IMDUR) 60 mg CR tablet Take 30 mg by mouth every morning.  levothyroxine (SYNTHROID) 75 mcg tablet Take 75 mcg by mouth Daily (before breakfast).  b complex-vitamin c-folic acid (NEPHROCAPS) 1 mg capsule Take 1 Capsule by mouth daily.  sevelamer carbonate (RENVELA) 800 mg tab tab Take 1,600 mg by mouth three (3) times daily (with meals).  cloNIDine HCL (CATAPRES) 0.1 mg tablet Take 0.1 mg by mouth DIALYSIS PRN.  heparin sodium,porcine (heparin, porcine,) 1,000 unit/mL injection 2,750 Units by IntraVENous route DIALYSIS ONCE. Maintenance dose during dialysis.  heparin sodium,porcine (heparin, porcine,) 1,000 unit/mL injection 1,000 Units by IntraVENous route DIALYSIS ONCE. Initial dose during dialysis.  amino acids-protein hydrolys (LiquaceL)  gram-kcal/30 mL liqd Take 60 mL by mouth DIALYSIS ONCE. Administered during dialysis.  cinacalcet (Sensipar) 30 mg tablet Take 120 mg by mouth DIALYSIS ONCE. Administered during dialysis.  iron sucrose (Venofer) 50 mg iron/2.5 mL soln 200 mg by IntraVENous route DIALYSIS ONCE. Past History     Past Medical History:  Past Medical History:   Diagnosis Date    Arrhythmia     Chronic kidney disease        Past Surgical History:  Past Surgical History:   Procedure Laterality Date    HX PACEMAKER         Family History:  History reviewed. No pertinent family history. Social History:  Social History     Tobacco Use    Smoking status: Former     Types: Cigarettes    Smokeless tobacco: Never   Vaping Use    Vaping Use: Never used   Substance Use Topics    Alcohol use: Not Currently    Drug use: Never       Allergies:  No Known Allergies    Review of Systems   Review of Systems   Constitutional:  Negative for fever. HENT:  Negative for congestion. Respiratory:  Positive for shortness of breath. Negative for cough. Cardiovascular:  Negative for chest pain. Gastrointestinal:  Negative for abdominal pain, constipation, nausea and vomiting. Genitourinary:  Negative for dysuria. Musculoskeletal:  Negative for arthralgias and myalgias. Skin:  Negative for rash. Allergic/Immunologic: Negative for immunocompromised state. Neurological:  Negative for syncope. Psychiatric/Behavioral:  Negative for confusion. Physical Exam   Physical Exam  Vitals reviewed. Constitutional:       General: He is in acute distress. Appearance: He is diaphoretic. HENT:      Head: Normocephalic and atraumatic. Eyes:      Extraocular Movements: Extraocular movements intact.       Pupils: Pupils are equal, round, and reactive to light. Cardiovascular:      Rate and Rhythm: Normal rate and regular rhythm. Heart sounds: Normal heart sounds. Pulmonary:      Breath sounds: Normal breath sounds. No stridor. No wheezing or rhonchi. Comments: Tachypnea  Abdominal:      Palpations: Abdomen is soft. Tenderness: There is no abdominal tenderness. Musculoskeletal:      Right lower leg: Edema present. Left lower leg: Edema present. Skin:     General: Skin is warm. Neurological:      General: No focal deficit present. Mental Status: He is alert.    Psychiatric:         Mood and Affect: Mood normal.         Behavior: Behavior normal.       Lab and Diagnostic Study Results   Labs -     Recent Results (from the past 12 hour(s))   BLOOD GAS, ARTERIAL    Collection Time: 10/01/22  8:58 PM   Result Value Ref Range    pH 7.38 7.35 - 7.45      PCO2 36 35 - 45 mmHg    PO2 135 (H) 80 - 100 mmHg    O2 SATURATION 99 95 - 99 %    BICARBONATE 21 (L) 22 - 26 mmol/L    BASE DEFICIT 3.9 mmol/L    O2 METHOD Nasal Cannula      O2 FLOW RATE 6.00 L/min    FIO2 44 %    Sample source Arterial      SITE Right Radial      LINDEN'S TEST YES      Carboxy-Hgb 1.6 1 - 2 %    Methemoglobin 0.3 0 - 1.4 %    Oxyhemoglobin 96.6 95 - 99 %    Performed by Andrew Rodríguez     TEMPERATURE 98.3     CBC WITH AUTOMATED DIFF    Collection Time: 10/01/22  9:02 PM   Result Value Ref Range    WBC 10.8 4.1 - 11.1 K/uL    RBC 3.41 (L) 4.10 - 5.70 M/uL    HGB 11.0 (L) 12.1 - 17.0 g/dL    HCT 34.4 (L) 36.6 - 50.3 %    .9 (H) 80.0 - 99.0 FL    MCH 32.3 26.0 - 34.0 PG    MCHC 32.0 30.0 - 36.5 g/dL    RDW 15.2 (H) 11.5 - 14.5 %    PLATELET 78 (L) 804 - 400 K/uL    MPV 10.8 8.9 - 12.9 FL    NRBC 0.0 0.0  WBC    ABSOLUTE NRBC 0.00 0.00 - 0.01 K/uL    NEUTROPHILS 64 32 - 75 %    BAND NEUTROPHILS 11 (H) 0 - 6 %    LYMPHOCYTES 4 (L) 12 - 49 %    MONOCYTES 6 5 - 13 %    EOSINOPHILS 1 0 - 7 %    BASOPHILS 0 0 - 1 %    METAMYELOCYTES 12 (H) 0 %    MYELOCYTES 1 (H) 0 %    BLASTS 1 (H) 0 %    IMMATURE GRANULOCYTES 0 %    ABS. NEUTROPHILS 8.1 (H) 1.8 - 8.0 K/UL    ABS. LYMPHOCYTES 0.4 (L) 0.8 - 3.5 K/UL    ABS. MONOCYTES 0.6 0.0 - 1.0 K/UL    ABS. EOSINOPHILS 0.1 0.0 - 0.4 K/UL    ABS. BASOPHILS 0.0 0.0 - 0.1 K/UL    ABS. IMM. GRANS. 0.0 K/UL    DF Manual      RBC COMMENTS Ovalocytes  1+        RBC COMMENTS Microcytosis  1+        RBC COMMENTS Macrocytosis  1+        RBC COMMENTS Anisocytosis  1+       METABOLIC PANEL, COMPREHENSIVE    Collection Time: 10/01/22  9:02 PM   Result Value Ref Range    Sodium 131 (L) 136 - 145 mmol/L    Potassium 5.9 (H) 3.5 - 5.1 mmol/L    Chloride 96 (L) 97 - 108 mmol/L    CO2 25 21 - 32 mmol/L    Anion gap 10 5 - 15 mmol/L    Glucose 35 (LL) 65 - 100 mg/dL    BUN 76 (H) 6 - 20 mg/dL    Creatinine 9.76 (H) 0.70 - 1.30 mg/dL    BUN/Creatinine ratio 8 (L) 12 - 20      GFR est AA 7 (L) >60 ml/min/1.73m2    GFR est non-AA 5 (L) >60 ml/min/1.73m2    Calcium 8.4 (L) 8.5 - 10.1 mg/dL    Bilirubin, total 0.7 0.2 - 1.0 mg/dL    AST (SGOT) 54 (H) 15 - 37 U/L    ALT (SGPT) 24 12 - 78 U/L    Alk.  phosphatase 46 45 - 117 U/L    Protein, total 6.5 6.4 - 8.2 g/dL    Albumin 2.5 (L) 3.5 - 5.0 g/dL    Globulin 4.0 2.0 - 4.0 g/dL    A-G Ratio 0.6 (L) 1.1 - 2.2     TROPONIN-HIGH SENSITIVITY    Collection Time: 10/01/22  9:02 PM   Result Value Ref Range    Troponin-High Sensitivity 1,286 (HH) 0 - 76 ng/L   D DIMER    Collection Time: 10/01/22  9:02 PM   Result Value Ref Range    D DIMER 4.15 (H) <0.50 ug/ml(FEU)   NT-PRO BNP    Collection Time: 10/01/22  9:02 PM   Result Value Ref Range    NT pro-BNP >35,000 (H) <125 pg/mL   COVID-19 RAPID TEST    Collection Time: 10/01/22  9:45 PM   Result Value Ref Range    COVID-19 rapid test Not Detected Not Detected     POTASSIUM    Collection Time: 10/01/22 10:08 PM   Result Value Ref Range    Potassium 5.8 (H) 3.5 - 5.1 mmol/L   GLUCOSE, POC    Collection Time: 10/01/22 10:11 PM   Result Value Ref Range    Glucose (POC) 30 (LL) 65 - 100 mg/dL    Performed by Ezequiel Patel    GLUCOSE, POC    Collection Time: 10/01/22 10:28 PM   Result Value Ref Range    Glucose (POC) 25 (LL) 65 - 100 mg/dL    Performed by Niranjan Edmond    GLUCOSE, POC    Collection Time: 10/01/22 10:29 PM   Result Value Ref Range    Glucose (POC) 39 (LL) 65 - 100 mg/dL    Performed by Niranjan Edmond    GLUCOSE, POC    Collection Time: 10/01/22 10:45 PM   Result Value Ref Range    Glucose (POC) 144 (H) 65 - 100 mg/dL    Performed by Niranjan Hayangus    GLUCOSE, POC    Collection Time: 10/01/22 10:46 PM   Result Value Ref Range    Glucose (POC) 119 (H) 65 - 100 mg/dL    Performed by Niranjan Edmond    GLUCOSE, POC    Collection Time: 10/01/22 11:32 PM   Result Value Ref Range    Glucose (POC) 34 (LL) 65 - 100 mg/dL    Performed by Niranjan Edmond    GLUCOSE, POC    Collection Time: 10/01/22 11:35 PM   Result Value Ref Range    Glucose (POC) 43 (LL) 65 - 100 mg/dL    Performed by Niranjan [employer]    GLUCOSE, POC    Collection Time: 10/01/22 11:51 PM   Result Value Ref Range    Glucose (POC) 454 (H) 65 - 100 mg/dL    Performed by Niranjan Hayangus    GLUCOSE, POC    Collection Time: 10/01/22 11:52 PM   Result Value Ref Range    Glucose (POC) 124 (H) 65 - 100 mg/dL    Performed by Niranjan Edmond        Radiologic Studies -   @lastxrresult@  CT Results  (Last 48 hours)                 10/01/22 2327  CTA CHEST W OR W WO CONT Final result    Impression:  Study was suboptimal by respiratory artifacts. No central pulmonary   embolism demonstrated. Consolidative opacification in basilar left lower lobe is   shown. Narrative:  INDICATION: Eval for PE       COMPARISON: Earlier chest x-ray, CT 3/23/2022       EXAM: Precontrast localizer was first performed to verify the levels of the   pulmonary arteries.  Subsequently, contiguous axial images were obtained after   the rapid IV bolus administration of 100 cc Isovue-370, followed by thin section   axial reconstructions with multiplanar reformations. Three-dimensional post -   processing was performed. CT dose reduction was achieved through use of a   standardized protocol tailored for this examination and automatic exposure   control for dose modulation. FINDINGS: Artifact related to left subclavian pacemaker with dual leads in right   atrium and right ventricle is shown. Images are degraded by substantial   respiratory artifacts. No central pulmonary embolism is demonstrated. No pleural   or pericardial effusion is shown. There is mild-moderate cardiac contour   enlargement. No mediastinal or hilar mass is evident. Pulmonary assessment is   suboptimal given the degree of respiratory artifacts. Nonetheless, there is   consolidative opacification in the posterior basilar left lower lobe. CXR Results  (Last 48 hours)                 10/01/22 2058  XR CHEST PORT Final result    Impression:      Hazy retrocardiac opacity may reflect atelectasis or airspace disease. Narrative:  EXAM:  XR CHEST PORT       INDICATION: Shortness of breath       COMPARISON: Chest radiograph 8/13/2022, CT chest 3/23/2022       TECHNIQUE: Upright portable chest AP view       FINDINGS:        Left pectoral pacemaker. Cardiac mediastinal silhouette is stably enlarged. Right hemidiaphragm elevation. Hazy retrocardiac opacity may reflect atelectasis   or airspace disease. Pleural spaces are grossly clear. Medical Decision Making and ED Course   Differential Diagnosis & Medical Decision Making Provider Note:   70-year-old male presenting for evaluation of dyspnea. He is tachypneic but has clear lungs, though concern for fluid overload given that he has missed his last dialysis session and has bilateral lower extremity edema. Initial management with BiPAP for pulmonary support. Chest x-ray to evaluate for pneumonia, pneumothorax, pulmonary edema.   We will reach out to nephrology to initiate dialysis. No history of PE, D-dimer as initial screening    - I am the first provider for this patient. I reviewed the vital signs, available nursing notes, past medical history, past surgical history, family history and social history. The patients presenting problems have been discussed, and they are in agreement with the care plan formulated and outlined with them. I have encouraged them to ask questions as they arise throughout their visit. Vital Signs-Reviewed the patient's vital signs. Patient Vitals for the past 12 hrs:   Temp Pulse Resp BP SpO2   10/01/22 2355 -- 62 20 -- --   10/01/22 2330 -- 68 21 (!) 91/56 --   10/01/22 2325 -- 64 22 96/70 --   10/01/22 2300 -- 65 23 (!) 80/54 --   10/01/22 2245 -- 64 21 98/74 --   10/01/22 2230 -- 61 16 93/73 --   10/01/22 2213 -- -- 28 (!) 81/69 97 %   10/01/22 2202 -- 63 21 (!) 89/61 98 %   10/01/22 2155 -- 62 30 (!) 80/42 97 %   10/01/22 2150 -- 61 30 (!) 74/56 98 %   10/01/22 2148 -- 63 (!) 31 (!) 68/45 98 %   10/01/22 2135 -- 67 (!) 33 (!) 127/113 98 %   10/01/22 2120 -- -- -- -- 100 %   10/01/22 2115 -- 70 29 (!) 152/96 100 %   10/01/22 2100 -- -- -- -- 100 %   10/01/22 2045 98.3 °F (36.8 °C) 68 28 (!) 141/107 100 %       ED Course:   ED Course as of 10/03/22 0704   Sat Oct 01, 2022   2102 ECG performed at 2047 shows normal sinus rhythm with a ventricular rate of 64, normal intervals, no ST elevation [BQ]   2104 Voicemail left for Dr. Ja Brito for dialysis [BQ]   2121 Patient now endorsing cocaine use within the last 24 hours [BQ]   2143 Glucose(!!): 35  Treating with dextrose [BQ]   2143 D DIMER(!): 4.15  Further evaluation for Possible PE with CTA of the chest [BQ]   2144 Troponin-High Sensitivity(!!): 1,286  NSTEMI, will initiate heparin once his CTA results [BQ]   2215 GLUCOSE,FAST - POC(!!): 30 [BQ]   2300 GLUCOSE,FAST - POC(!): 119 [BQ]   2300 Spoke with Dr. Cyndi Molina who reports no dialysis available at [facility] over the weekend.  Attempting to transfer to [facility] ED [BQ]   2356 No PE seen on CT, will treat opacity as pneumonia [BQ]   Sun Oct 02, 2022   0015 [facility] arriving imminently, will hold on starting antibiotics due to limited IV access  [BQ]      ED Course User Index  [BQ] Chichi John MD         Procedures   Performed by: Sepideh Wheeler MD  Central Line    Date/Time: 10/3/2022 7:05 AM  Performed by: Chichi John MD  Authorized by: Chichi John MD     Consent:     Consent obtained:  Emergent situation  Universal protocol:     Relevant documents present and verified: yes      Test results available: yes      Imaging studies available: yes      Required blood products, implants, devices, and special equipment available: yes      Site/side marked: yes      Immediately prior to procedure, a time out was called: yes      Patient identity confirmed:  Arm band  Pre-procedure details:     Indication(s): central venous access      Hand hygiene: Hand hygiene performed prior to insertion      Sterile barrier technique:  All elements of maximal sterile technique followed      Skin preparation:  Chlorhexidine    Skin preparation agent: Skin preparation agent completely dried prior to procedure    Sedation:     Sedation type:  None  Anesthesia:     Anesthesia method:  Local infiltration    Local anesthetic:  Lidocaine 1% w/o epi  Procedure details:     Location:  R internal jugular    Site selection rationale:  Left sided fistula and pacer present    Patient position:  Supine    Procedural supplies:  Triple lumen    Catheter size:  7 Fr    Landmarks identified: yes      Ultrasound guidance: yes      Ultrasound guidance timing: prior to insertion and real time      Sterile ultrasound techniques: Sterile gel and sterile probe covers were used      Number of attempts:  2    Successful placement: no    Post-procedure details:     Post-procedure:  Dressing applied    Procedure completion:  Tolerated  Comments:      Attempted twice with successful vein puncture and blood return. Unable to thread wire and wire confirmed coiled in the vessel on ultrasound at approximately the level of the clavicle. EJ placed     CRITICAL CARE NOTE :    9:37 PM    IMPENDING DETERIORATION -Respiratory  ASSOCIATED RISK FACTORS - Hypoxia  MANAGEMENT- Bedside Assessment and Supervision of Care  INTERPRETATION -  Xrays, Blood Gases, and ECG  INTERVENTIONS - vent mngmt  CASE REVIEW - Hospitalist/Intensivist and Medical Sub-Specialist  TREATMENT RESPONSE -Stable  PERFORMED BY - Self    NOTES   :  I have spent 35 minutes of critical care time involved in lab review, consultations with specialist, family decision- making, bedside attention and documentation. This time excludes time spent in any separate billed procedures. During this entire length of time I was immediately available to the patient . Moe Antunez MD      Disposition   [facility] verbally agreed to transfer and understand the risks involved as outlined in the EMTALA form. Diagnosis/Clinical Impression     Clinical Impression:   1. Hypoglycemia    2. NSTEMI (non-ST elevated myocardial infarction) ([facility] [address]    3. ESRD (end stage renal disease) ([facility] [address]    4. Pneumonia of left lower lobe due to infectious organism        Attestations: I, Moe Antunez MD, am the primary clinician of record. Please note that this dictation was completed with [employer], the computer voice recognition software. Quite often unanticipated grammatical, syntax, homophones, and other interpretive errors are inadvertently transcribed by the computer software. Please disregard these errors. Please excuse any errors that have escaped final proofreading. Thank you.

## 2022-10-02 NOTE — ED NOTES
Patient calmer and resting comfortably on stretcher. RR even and slightly labored. He remains tachypneic and on the bipap. He is tolerating it well now.

## 2022-10-02 NOTE — ED NOTES
Patient's blood pressure continues to drop. Spoke to provider again about placing central line for pressors. He is at bedside now to place.

## 2022-10-02 NOTE — ED NOTES
Patient returned from CT and was placed back on monitoring equipment. Patient is awake and answers questions more than before. Patient verbalizes no needs or complaints.

## 2022-10-02 NOTE — ED NOTES
Notified provider that patient isnt tolerating the bipap very well and continues to want to take it off. Patient is restless in the bed, has had me put the head of the bed all of the way down and then all of the way back up then half way back. He is unable to get comfortable. He denies chest pain.

## 2022-10-03 LAB
ATRIAL RATE: 64 BPM
CALCULATED P AXIS, ECG09: 9 DEGREES
CALCULATED R AXIS, ECG10: -27 DEGREES
CALCULATED T AXIS, ECG11: 33 DEGREES
DIAGNOSIS, 93000: NORMAL
GLUCOSE BLD STRIP.AUTO-MCNC: 69 MG/DL (ref 65–117)
P-R INTERVAL, ECG05: 190 MS
Q-T INTERVAL, ECG07: 442 MS
QRS DURATION, ECG06: 88 MS
QTC CALCULATION (BEZET), ECG08: 455 MS
SERVICE CMNT-IMP: NORMAL
VENTRICULAR RATE, ECG03: 64 BPM

## 2022-10-07 LAB
BACTERIA SPEC CULT: NORMAL
SERVICE CMNT-IMP: NORMAL

## 2022-10-17 NOTE — DISCHARGE SUMMARY
Admit date: 10/2/2022   Admitting Provider: Allison Chandra DO    Discharge date: 10/17/2022  Discharging Provider: Allison Chandra DO      * Admission Diagnoses: Hypoglycemia [E16.2]    * Discharge Diagnoses:    Hospital Problems as of 10/2/2022 Never Reviewed            Codes Class Noted - Resolved POA    Hypoglycemia ICD-10-CM: E16.2  ICD-9-CM: 251.2  10/2/2022 - Present Unknown           * Hospital Course: 67M with ESRD MWF, missed this past Friday, HTN, HFrEf 35%, SSS with PPM, severe pHTNwho presented to [facility] ED with SOB requiring Bipap. Found to have hypoglycemia in 30s refractory to D10 x3, started on D10 gtt. Hyperkalemic. CT PE negative. Transferred to [facility] for further management. Early in hospitalization patient underwent cardiac arrest. ACLS was initiated as detailed in prior notes. Ultimately patient failed to achieve ROSC and pronounced dead. Consults: None        Discharge Exam:  No exam performed today, patient .     * Discharge Condition:    * Disposition:   Home    Discharge Medications:  Discharge Medication List as of 10/2/2022  2:55 PM          * Follow-up Care/Patient Instructions:  NA    Follow-up Information    None         Signed:  Allison Chandra DO  10/17/2022  6:01 PM